# Patient Record
Sex: FEMALE | Race: WHITE | NOT HISPANIC OR LATINO | Employment: OTHER | URBAN - METROPOLITAN AREA
[De-identification: names, ages, dates, MRNs, and addresses within clinical notes are randomized per-mention and may not be internally consistent; named-entity substitution may affect disease eponyms.]

---

## 2020-08-28 ENCOUNTER — EVALUATION (OUTPATIENT)
Dept: PHYSICAL THERAPY | Age: 75
End: 2020-08-28
Payer: COMMERCIAL

## 2020-08-28 DIAGNOSIS — Z47.1 AFTERCARE FOLLOWING LEFT SHOULDER JOINT REPLACEMENT SURGERY: ICD-10-CM

## 2020-08-28 DIAGNOSIS — Z96.612 STATUS POST TOTAL SHOULDER REPLACEMENT, LEFT: Primary | ICD-10-CM

## 2020-08-28 DIAGNOSIS — Z96.612 AFTERCARE FOLLOWING LEFT SHOULDER JOINT REPLACEMENT SURGERY: ICD-10-CM

## 2020-08-28 PROCEDURE — 97110 THERAPEUTIC EXERCISES: CPT | Performed by: PHYSICAL THERAPIST

## 2020-08-28 PROCEDURE — 97162 PT EVAL MOD COMPLEX 30 MIN: CPT | Performed by: PHYSICAL THERAPIST

## 2020-08-28 RX ORDER — ROPINIROLE 0.5 MG/1
0.5 TABLET, FILM COATED ORAL 2 TIMES DAILY
COMMUNITY

## 2020-08-28 RX ORDER — LISINOPRIL 40 MG/1
40 TABLET ORAL DAILY
COMMUNITY

## 2020-08-28 NOTE — PROGRESS NOTES
PT Evaluation     Today's date: 2020  Patient name: Alvina Chamorro  : 1945  MRN: [id number]  Referring provider: Jose Ndiaye MD  Dx:   Encounter Diagnosis     ICD-10-CM    1  Status post total shoulder replacement, left  Z96 612    2  Aftercare following left shoulder joint replacement surgery  Z47 1     Z96 612        Start Time: 945  Stop Time: 1045  Total time in clinic (min): 60 minutes    Assessment  Assessment details: PT IE: 2020  Patient had left TSA on 2020  Patient has the following movement limitations as per surgeon: max ER at 30 degrees, NO active IR to protect the subscapularis, wear sling for 4 weeks ( until 2020) aarom flexion at 110 and active elbow wrist and hand movements to pt tolerance  Patient noted she as nauseated yesterday which she feels is due to anesthesia  Patient noted she had a standard TSA on 2020  Patient noted prior to surgery left GH joint was painful with elevation based movements which worsened and then due to inactivity due to COVID her left shoulder pain worsened  Patient noted prior to surgical intervention she will have intermittent left clavicle to neck to jaw pain which was present when overall activity during the day increased  Patient noted she is right ue dominant  Patient noted she still has left 1 and 2 finger tingling sensation due to left ue nerve block which she noted yesterday all 5 fingers were tingling  Patient denies left hand edema  Patient noted she was able to sleep in a recliner due to sleep in a bed not occurring due to extreme difficulties getting in and out of bed  Patient noted she returns to Dr Melissa Briones in one month  Patient noted the day after surgery she was instructed to not move her left shoulder  Patient noted she requires assistance of getting dressed as well as sling donning and doffing    Impairments: abnormal or restricted ROM, abnormal movement, activity intolerance, lacks appropriate home exercise program and pain with function  Understanding of Dx/Px/POC: excellent   Prognosis: good  Prognosis details: Patient is a 76y o  year old female seen for outpatient PT evaluation with pain, mobility and functional deficits due to s/p left TSA  Patient presents to PT IE with the following problems, concerns, deficits and impairments: left shoulder region pain, decreased left ue range of motion, decreased left ue strength, restriction of no left GH arom, wearing of left ue sling for 4 weeks after surgical intervention which took place on 08/26/2020, functional limitations with dependent need for dressing, self iadls, driving, sleep deprived and decreased tolerance to activity  Patient would benefit from skilled PT services under the following PT treatment plan to address the above noted deficits: therapeutic exercises and activities to facilitate left ue rom and strength as per surgeon specific protocols, postural reeducation and strengthening, modalities, manual therapy techniques, IASTM techniques, Kinesio tapings and a hep  Thank you for the referral      Goals  Short Term goals 4 - 6 weeks  1  Patient will be independent HEP  2   Patient will report a 25 - 50% decrease in pain complaints  3   Increase strength 1/2 grade  4   Increase ROM 5-10 degrees  Long Term goals 10 - 12 weeks  1  Patient will report elimination of pain complaints  2   Patient will return to all recreational activities without restriction  3   ROM WFL  4   Strength 5/5   5   Patient will exhibit the ability to sleep in bed  6   Patient will exhibit the ability to dress herself without assistance  7   Patient will exhibit the ability to shower independently  8   Patient will be able to assist  with his activities as she did pre-surgery  9   Patient will be able to drive independently  10   Patient will be able to resume all house hold functional activities      Plan  Patient would benefit from: skilled physical therapy  Planned modality interventions: cryotherapy, TENS, thermotherapy: hydrocollator packs and unattended electrical stimulation  Planned therapy interventions: joint mobilization, manual therapy, massage, neuromuscular re-education, body mechanics training, patient education, postural training, self care, compression, strengthening, stretching, therapeutic activities, therapeutic exercise, therapeutic training, flexibility, functional ROM exercises, graded activity, graded exercise, graded motor and home exercise program  Frequency: 2x week  Duration in weeks: 12  Treatment plan discussed with: patient        Subjective Evaluation    History of Present Illness  Mechanism of injury: Patient's PMHx is remarkable for HTN with 40 mg of Lisinopril and taking metformin 500 mg 2 x per day, and restless leg syndrome and is taking Requip 2 x per day and bilateral TKA  Pain  At best pain ratin  At worst pain ratin  Location: left shoulder    Patient Goals  Patient goals for therapy: decreased pain, increased motion, increased strength and independence with ADLs/IADLs          Objective     Active Range of Motion     Right Shoulder   Flexion: 164 degrees   Abduction: 162 degrees   External rotation 90°: 80 degrees  Internal rotation 90°: 55 degrees     Right Elbow   Flexion: 142 degrees   Extension: -5 degrees     Passive Range of Motion   Left Shoulder   Flexion: 60 degrees with pain  Abduction: 45 degrees with pain  External rotation 0°: 0 degrees with pain  Internal rotation 0°: 10 degrees with pain    Strength/Myotome Testing     Right Shoulder     Planes of Motion   Flexion: 5   Abduction: 4+   External rotation at 0°: 4   Internal rotation at 0°: 5     Right Elbow   Flexion: 5  Extension: 5    General Comments:      Shoulder Comments   Patient currently has left TSA incision covered with sterile gauze dressing        Flowsheet Rows      Most Recent Value   PT/OT G-Codes   Current Score  4   Projected Score 48             Precautions: s/p Right TSA on 08/26/2020: max ER at 30 degrees, NO active IR to protect the subscapularis, wear sling for 4 weeks ( until 9/26/2020) aarom flexion at 110 and active elbow wrist and hand movements to pt tolerance  Patient's PMHx is remarkable for HTN with 40 mg of Lisinopril and taking metformin 500 mg 2 x per day, and restless leg syndrome and is taking Requip 2 x per day and bilateral TKA        Manuals 08/28            Prom left GH joint with specific restrictions noted above                                                    Neuro Re-Ed                                                                                                        Ther Ex             pulleys             Scapular squeezes             Shoulder shrugs             Pendulums:L forward / back 5 x            Elbow flexion:L 3 x             Forearm supination:L 3 x             Wrist flexion and extension:L 3 x             Hand gripping:L 3 x             Left UT stretch 2 x             Left LS stretch             Postural correction while seated             Prom left shoulder flexion table slides                                       Left shoulder prom ER with spc with motion restrictions listed above                                                                              Ther Activity                                       Gait Training                                       Modalities             CP to left GH joint with pt seated 10 min

## 2020-08-28 NOTE — LETTER
October 15, 2020    [facility] [address] [facility]    Patient: Luna Michael   YOB: 1945   Date of Visit: 2020     Encounter Diagnosis     ICD-10-CM    1  Status post total shoulder replacement, left  Z96 612    2  Aftercare following left shoulder joint replacement surgery  Z47 1     [id number]        Dear Dr Mark Coleman: Thank you for your recent referral of Luna Michael  Please review the attached evaluation summary from [facility] recent visit  Please verify that you agree with the plan of care by signing the attached order  If you have any questions or concerns, please do not hesitate to call  I sincerely appreciate the opportunity to share in the care of one of your patients and hope to have another opportunity to work with you in the near future  Sincerely,    Michael Rivas, PT      Referring Provider:      I certify that I have read the below Plan of Care and certify the need for these services furnished under this plan of treatment while under my care  Prabha Britton MD  [facility]: [phone]          PT Evaluation     Today's date: 2020  Patient name: Luna Michael  : 1945  MRN: [id number]  Referring provider: Joe Ward MD  Dx:   Encounter Diagnosis     ICD-10-CM    1  Status post total shoulder replacement, left  Z96 612    2  Aftercare following left shoulder joint replacement surgery  Z47 1     Z96 612        Start Time: 0945  Stop Time: 1045  Total time in clinic (min): 60 minutes    Assessment  Assessment details: PT IE: 2020  Patient had left TSA on 2020  Patient has the following movement limitations as per surgeon: max ER at 30 degrees, NO active IR to protect the subscapularis, wear sling for 4 weeks ( until 2020) aarom flexion at 110 and active elbow wrist and hand movements to pt tolerance    Patient noted she as nauseated yesterday which she feels is due to anesthesia  Patient noted she had a standard TSA on 8/26/2020  Patient noted prior to surgery left GH joint was painful with elevation based movements which worsened and then due to inactivity due to COVID her left shoulder pain worsened  Patient noted prior to surgical intervention she will have intermittent left clavicle to neck to jaw pain which was present when overall activity during the day increased  Patient noted she is right ue dominant  Patient noted she still has left 1 and 2 finger tingling sensation due to left ue nerve block which she noted yesterday all 5 fingers were tingling  Patient denies left hand edema  Patient noted she was able to sleep in a recliner due to sleep in a bed not occurring due to extreme difficulties getting in and out of bed  Patient noted she returns to Dr Olson Failing in one month  Patient noted the day after surgery she was instructed to not move her left shoulder  Patient noted she requires assistance of getting dressed as well as sling donning and doffing  Impairments: abnormal or restricted ROM, abnormal movement, activity intolerance, lacks appropriate home exercise program and pain with function  Understanding of Dx/Px/POC: excellent   Prognosis: good  Prognosis details: Patient is a 76y o  year old female seen for outpatient PT evaluation with pain, mobility and functional deficits due to s/p left TSA  Patient presents to PT IE with the following problems, concerns, deficits and impairments: left shoulder region pain, decreased left ue range of motion, decreased left ue strength, restriction of no left GH arom, wearing of left ue sling for 4 weeks after surgical intervention which took place on 08/26/2020, functional limitations with dependent need for dressing, self iadls, driving, sleep deprived and decreased tolerance to activity    Patient would benefit from skilled PT services under the following PT treatment plan to address the above noted deficits: therapeutic exercises and activities to facilitate left ue rom and strength as per surgeon specific protocols, postural reeducation and strengthening, modalities, manual therapy techniques, IASTM techniques, Kinesio tapings and a hep  Thank you for the referral      Goals  Short Term goals 4 - 6 weeks  1  Patient will be independent HEP  2   Patient will report a 25 - 50% decrease in pain complaints  3   Increase strength 1/2 grade  4   Increase ROM 5-10 degrees  Long Term goals 10 - 12 weeks  1  Patient will report elimination of pain complaints  2   Patient will return to all recreational activities without restriction  3   ROM WFL  4   Strength 5/5   5   Patient will exhibit the ability to sleep in bed  6   Patient will exhibit the ability to dress herself without assistance  7   Patient will exhibit the ability to shower independently  8   Patient will be able to assist  with his activities as she did pre-surgery  9   Patient will be able to drive independently  10   Patient will be able to resume all house hold functional activities      Plan  Patient would benefit from: skilled physical therapy  Planned modality interventions: cryotherapy, TENS, thermotherapy: hydrocollator packs and unattended electrical stimulation  Planned therapy interventions: joint mobilization, manual therapy, massage, neuromuscular re-education, body mechanics training, patient education, postural training, self care, compression, strengthening, stretching, therapeutic activities, therapeutic exercise, therapeutic training, flexibility, functional ROM exercises, graded activity, graded exercise, graded motor and home exercise program  Frequency: 2x week  Duration in weeks: 12  Treatment plan discussed with: patient        Subjective Evaluation    History of Present Illness  Mechanism of injury: Patient's PMHx is remarkable for HTN with 40 mg of Lisinopril and taking metformin 500 mg 2 x per day, and restless leg syndrome and is taking Requip 2 x per day and bilateral TKA  Pain  At best pain ratin  At worst pain ratin  Location: left shoulder    Patient Goals  Patient goals for therapy: decreased pain, increased motion, increased strength and independence with ADLs/IADLs          Objective     Active Range of Motion     Right Shoulder   Flexion: 164 degrees   Abduction: 162 degrees   External rotation 90°: 80 degrees  Internal rotation 90°: 55 degrees     Right Elbow   Flexion: 142 degrees   Extension: -5 degrees     Passive Range of Motion   Left Shoulder   Flexion: 60 degrees with pain  Abduction: 45 degrees with pain  External rotation 0°: 0 degrees with pain  Internal rotation 0°: 10 degrees with pain    Strength/Myotome Testing     Right Shoulder     Planes of Motion   Flexion: 5   Abduction: 4+   External rotation at 0°: 4   Internal rotation at 0°: 5     Right Elbow   Flexion: 5  Extension: 5    General Comments:      Shoulder Comments   Patient currently has left TSA incision covered with sterile gauze dressing  Flowsheet Rows      Most Recent Value   PT/OT G-Codes   Current Score  4   Projected Score  48             Precautions: s/p Right TSA on 2020: max ER at 30 degrees, NO active IR to protect the subscapularis, wear sling for 4 weeks ( until 2020) aarom flexion at 110 and active elbow wrist and hand movements to pt tolerance  Patient's PMHx is remarkable for HTN with 40 mg of Lisinopril and taking metformin 500 mg 2 x per day, and restless leg syndrome and is taking Requip 2 x per day and bilateral TKA        Manuals             Prom left GH joint with specific restrictions noted above                                                    Neuro Re-Ed                                                                                                        Ther Ex             pulleys             Scapular squeezes             Shoulder shrugs Pendulums:L forward / back 5 x            Elbow flexion:L 3 x             Forearm supination:L 3 x             Wrist flexion and extension:L 3 x             Hand gripping:L 3 x             Left UT stretch 2 x             Left LS stretch             Postural correction while seated             Prom left shoulder flexion table slides                                       Left shoulder prom ER with spc with motion restrictions listed above                                                                              Ther Activity                                       Gait Training                                       Modalities             CP to left GH joint with pt seated 10 min

## 2020-09-01 ENCOUNTER — OFFICE VISIT (OUTPATIENT)
Dept: PHYSICAL THERAPY | Age: 75
End: 2020-09-01
Payer: COMMERCIAL

## 2020-09-01 DIAGNOSIS — Z96.612 AFTERCARE FOLLOWING LEFT SHOULDER JOINT REPLACEMENT SURGERY: Primary | ICD-10-CM

## 2020-09-01 DIAGNOSIS — Z47.1 AFTERCARE FOLLOWING LEFT SHOULDER JOINT REPLACEMENT SURGERY: Primary | ICD-10-CM

## 2020-09-01 DIAGNOSIS — Z96.612 STATUS POST TOTAL SHOULDER REPLACEMENT, LEFT: ICD-10-CM

## 2020-09-01 PROCEDURE — 97140 MANUAL THERAPY 1/> REGIONS: CPT

## 2020-09-01 PROCEDURE — 97110 THERAPEUTIC EXERCISES: CPT

## 2020-09-01 NOTE — PROGRESS NOTES
Daily Note     Today's date: 2020  Patient name: Todd Stewart  : 1945  MRN: [id number]  Referring provider: Jesica Singer MD  Dx:   Encounter Diagnosis     ICD-10-CM    1  Aftercare following left shoulder joint replacement surgery  Z47 1     Z96 612    2  Status post total shoulder replacement, left  Z96 612                   Subjective: "I have no pain I just wish I could sleep"       Objective: See treatment diary below      Assessment: Ecchymosis noted at L UE, good tolerance to PROM  Plan: Cont with plan of care      Precautions: s/p Right TSA on 2020: max ER at 30 degrees, NO active IR to protect the subscapularis, wear sling for 4 weeks ( until 2020) aarom flexion at 110 and active elbow wrist and hand movements to pt tolerance  Patient's PMHx is remarkable for HTN with 40 mg of Lisinopril and taking metformin 500 mg 2 x per day, and restless leg syndrome and is taking Requip 2 x per day and bilateral TKA        Manuals           Prom left GH joint with specific restrictions noted above  10 MIN                       Ther Ex            pulleys  5 min           Scapular squeezes  20x 2sec           Shoulder shrugs  NT          Pendulums:L forward / back 5 x 20x           Elbow flexion:L 3 x  20x           Forearm supination:L 3 x            Wrist flexion and extension:L 3 x  20x           Hand gripping:L 3 x  Yellow  10x3          Left UT stretch 2 x  20sec 5x           Left LS stretch  NT           Postural correction while seated  20X           Prom left shoulder flexion table slides  NT                                  Left shoulder prom ER with spc with motion restrictions listed above  NT                                              Modalities            CP to left GH joint with pt seated 10 min 10 MIN

## 2020-09-04 ENCOUNTER — OFFICE VISIT (OUTPATIENT)
Dept: PHYSICAL THERAPY | Age: 75
End: 2020-09-04
Payer: COMMERCIAL

## 2020-09-04 DIAGNOSIS — Z47.1 AFTERCARE FOLLOWING LEFT SHOULDER JOINT REPLACEMENT SURGERY: Primary | ICD-10-CM

## 2020-09-04 DIAGNOSIS — Z96.612 AFTERCARE FOLLOWING LEFT SHOULDER JOINT REPLACEMENT SURGERY: Primary | ICD-10-CM

## 2020-09-04 DIAGNOSIS — Z96.612 STATUS POST TOTAL SHOULDER REPLACEMENT, LEFT: ICD-10-CM

## 2020-09-04 PROCEDURE — 97110 THERAPEUTIC EXERCISES: CPT | Performed by: PHYSICAL THERAPIST

## 2020-09-04 PROCEDURE — 97140 MANUAL THERAPY 1/> REGIONS: CPT | Performed by: PHYSICAL THERAPIST

## 2020-09-04 NOTE — PROGRESS NOTES
Daily Note     Today's date: 2020  Patient name: Pinky Adorno  : 1945  MRN: [id number]  Referring provider: Jesse Sams MD  Dx:   Encounter Diagnosis     ICD-10-CM    1  Aftercare following left shoulder joint replacement surgery  Z47 1     Z96 612    2  Status post total shoulder replacement, left  Z96 612                   Subjective: Patient reported her left UE sling does not provide enough support and she feels as if she is using her left shoulder and thus it is very fatigued  Patient noted at 1-2 of 10 left [address] joint region pain persists  Objective: See treatment diary below      Assessment: Patient still exhibits Ecchymosis on left biceps and ventral left forearm region  Patient presents with left [address] joint elevation at 105 degrees and ER at 25 degrees  Plan: Cont with plan of care      Precautions: s/p Right TSA on 2020: max ER at 30 degrees, NO active IR to protect the subscapularis, wear sling for 4 weeks ( until 2020) aarom flexion at 110 and active elbow wrist and hand movements to pt tolerance  Patient's PMHx is remarkable for HTN with 40 mg of Lisinopril and taking metformin 500 mg 2 x per day, and restless leg syndrome and is taking Requip 2 x per day and bilateral TKA        Manuals          Prom left GH joint with specific restrictions noted above  10 MIN  10 min                     Ther Ex            pulleys  5 min  6 min         Scapular squeezes  20x 2sec  3 sec x 20         Shoulder shrugs  NT          Pendulums:L forward / back & side to side 5 x 20x  2 x 10         Elbow flexion:L 3 x  20x  2 x 10         Forearm supination:L 3 x   2 x 10         Wrist flexion and extension:L 3 x  20x  2 x 10         Hand gripping:L 3 x  Yellow  10x3 Green x 20         Left UT stretch 2 x  20sec 5x  20 sec x 5         Left LS stretch  NT  20 sec x 5         Postural correction while seated  20X  3 sec x 20         Prom left shoulder flexion table slides  NT 2 x 10                                 Left shoulder prom ER with spc with motion restrictions listed above  NT NT                                             Modalities            CP to left GH joint with pt seated 10 min 10 MIN  10 min

## 2020-09-08 ENCOUNTER — OFFICE VISIT (OUTPATIENT)
Dept: PHYSICAL THERAPY | Age: 75
End: 2020-09-08
Payer: COMMERCIAL

## 2020-09-08 DIAGNOSIS — Z96.612 AFTERCARE FOLLOWING LEFT SHOULDER JOINT REPLACEMENT SURGERY: Primary | ICD-10-CM

## 2020-09-08 DIAGNOSIS — Z47.1 AFTERCARE FOLLOWING LEFT SHOULDER JOINT REPLACEMENT SURGERY: Primary | ICD-10-CM

## 2020-09-08 DIAGNOSIS — Z96.612 STATUS POST TOTAL SHOULDER REPLACEMENT, LEFT: ICD-10-CM

## 2020-09-08 PROCEDURE — 97140 MANUAL THERAPY 1/> REGIONS: CPT | Performed by: PHYSICAL THERAPIST

## 2020-09-08 PROCEDURE — 97110 THERAPEUTIC EXERCISES: CPT | Performed by: PHYSICAL THERAPIST

## 2020-09-08 NOTE — PROGRESS NOTES
Daily Note     Today's date: 2020  Patient name: Duyen Lehman  : 1945  MRN: [id number]  Referring provider: Jenn Luna MD  Dx:   Encounter Diagnosis     ICD-10-CM    1  Aftercare following left shoulder joint replacement surgery  Z47 1     Z96 612    2  Status post total shoulder replacement, left  Z96 612                   Subjective: Patient noted left shoulder pain is decreased since betting her left ue sling to fit better   Patient noted left shoulder pain is at 1-2 of 10  Objective: See treatment diary below  Assessment:  Patient continues to experience left shoulder pain reduction since adjustment in left UE sling  Patient continues to exhibit continued decrease in Ecchymosis on left biceps and ventral left forearm region  Patient presents with left [address] joint elevation at 110 degrees and ER at 30 degrees  Plan: Cont with plan of care      Precautions: s/p Right TSA on 2020: max ER at 30 degrees, NO active IR to protect the subscapularis, wear sling for 4 weeks ( until 2020) aarom flexion at 110 and active elbow wrist and hand movements to pt tolerance  Patient's PMHx is remarkable for HTN with 40 mg of Lisinopril and taking metformin 500 mg 2 x per day, and restless leg syndrome and is taking Requip 2 x per day and bilateral TKA        Manuals         Prom left GH joint with specific restrictions noted above  10 MIN  10 min 10 min                    Ther Ex            pulleys  5 min  6 min 6 min        Scapular squeezes  20x 2sec  3 sec x 20 3 sec x 20        Shoulder shrugs  NT          Pendulums:L forward / back & side to side 5 x 20x  2 x 10 2 x 10        Elbow flexion:L 3 x  20x  2 x 10 2 x 10        Forearm supination:L 3 x   2 x 10 2 x 10        Wrist flexion and extension:L 3 x  20x  2 x 10 2 x 10          Hand gripping:L 3 x  Yellow  10x3 Green x 20 Green x 20        Left UT stretch 2 x  20sec 5x  20 sec x 5         Left LS stretch  NT  20 sec x 5         Postural correction while seated  20X  3 sec x 20         Prom left shoulder flexion table slides  NT 2 x 10 2 x 10                                Left shoulder prom ER and abd with spc with motion restrictions listed above  NT NT 2 x 10                                            Modalities            CP to left GH joint with pt seated 10 min 10 MIN  10 min

## 2020-09-11 ENCOUNTER — OFFICE VISIT (OUTPATIENT)
Dept: PHYSICAL THERAPY | Age: 75
End: 2020-09-11
Payer: COMMERCIAL

## 2020-09-11 DIAGNOSIS — Z96.612 STATUS POST TOTAL SHOULDER REPLACEMENT, LEFT: ICD-10-CM

## 2020-09-11 DIAGNOSIS — Z47.1 AFTERCARE FOLLOWING LEFT SHOULDER JOINT REPLACEMENT SURGERY: Primary | ICD-10-CM

## 2020-09-11 DIAGNOSIS — Z96.612 AFTERCARE FOLLOWING LEFT SHOULDER JOINT REPLACEMENT SURGERY: Primary | ICD-10-CM

## 2020-09-11 PROCEDURE — 97140 MANUAL THERAPY 1/> REGIONS: CPT | Performed by: PHYSICAL THERAPIST

## 2020-09-11 PROCEDURE — 97110 THERAPEUTIC EXERCISES: CPT | Performed by: PHYSICAL THERAPIST

## 2020-09-11 NOTE — PROGRESS NOTES
Daily Note     Today's date: 2020  Patient name: Nadir Bridges  : 1945  MRN: [id number]  Referring provider: Ha Hines MD  Dx:   Encounter Diagnosis     ICD-10-CM    1  Aftercare following left shoulder joint replacement surgery  Z47 1     Z96 612    2  Status post total shoulder replacement, left  Z96 612                   Subjective: Patient noted left shoulder pain is decreased since betting her left ue sling to fit better   Patient noted left shoulder pain is at 1 of 10  Objective: See treatment diary below  Assessment:  Patient presents with apprehension with all prom but all prom elevation and IR is at protocol limits while ER is at 40 degrees  Plan: Cont with plan of care      Precautions: s/p Right TSA on 2020: max ER at 30 degrees, NO active IR to protect the subscapularis, wear sling for 4 weeks ( until 2020) aarom flexion at 110 and active elbow wrist and hand movements to pt tolerance  Weeks 1-2 (2020 - 2020 ) : FF at 90; abd at 75, ER at 45 abd at 30 and IR at 45 abd at 30  Weeks 3-4 (09/10/2020 - 2020 ) : FF at 120; abd at 90, ER at 75 abd at 50 and IR at 75 abd at 45      Patient's PMHx is remarkable for HTN with 40 mg of Lisinopril and taking metformin 500 mg 2 x per day, and restless leg syndrome and is taking Requip 2 x per day and bilateral TKA        Manuals        Prom left GH joint with specific restrictions noted above  10 MIN  10 min 10 min 10 min                   Ther Ex            pulleys  5 min  6 min 6 min 6 min       Scapular squeezes  20x 2sec  3 sec x 20 3 sec x 20 3 sec x 20       Shoulder shrugs  NT NT NT NT       Pendulums:L forward / back & side to side 5 x 20x  2 x 10 2 x 10 2 x 10       Elbow flexion:L 3 x  20x  2 x 10 2 x 10 2 x 10       Forearm supination:L 3 x   2 x 10 2 x 10 2 x 10       Wrist flexion and extension:L 3 x  20x  2 x 10 2 x 10   2 x 10       Hand gripping:L 3 x  Yellow  10x3 Green x 20 Green x 20 NT       Left UT stretch 2 x  20sec 5x  20 sec x 5  20 sec x 5       Left LS stretch  NT  20 sec x 5  20 sec x 5       Postural correction while seated  20X  3 sec x 20  3 sec x 20       Prom left shoulder flexion table slides  NT 2 x 10 2 x 10 2 x 10 flex and scap                               Left shoulder prom ER and abd with spc with motion restrictions listed above  NT NT 2 x 10 2 x 10                                           Modalities            CP to left GH joint with pt seated 10 min 10 MIN  10 min  10 min

## 2020-09-16 ENCOUNTER — OFFICE VISIT (OUTPATIENT)
Dept: PHYSICAL THERAPY | Age: 75
End: 2020-09-16
Payer: COMMERCIAL

## 2020-09-16 DIAGNOSIS — Z96.612 AFTERCARE FOLLOWING LEFT SHOULDER JOINT REPLACEMENT SURGERY: Primary | ICD-10-CM

## 2020-09-16 DIAGNOSIS — Z47.1 AFTERCARE FOLLOWING LEFT SHOULDER JOINT REPLACEMENT SURGERY: Primary | ICD-10-CM

## 2020-09-16 DIAGNOSIS — Z96.612 STATUS POST TOTAL SHOULDER REPLACEMENT, LEFT: ICD-10-CM

## 2020-09-16 PROCEDURE — 97140 MANUAL THERAPY 1/> REGIONS: CPT | Performed by: PHYSICAL THERAPIST

## 2020-09-16 PROCEDURE — 97110 THERAPEUTIC EXERCISES: CPT | Performed by: PHYSICAL THERAPIST

## 2020-09-18 ENCOUNTER — OFFICE VISIT (OUTPATIENT)
Dept: PHYSICAL THERAPY | Age: 75
End: 2020-09-18
Payer: COMMERCIAL

## 2020-09-18 DIAGNOSIS — Z47.1 AFTERCARE FOLLOWING LEFT SHOULDER JOINT REPLACEMENT SURGERY: Primary | ICD-10-CM

## 2020-09-18 DIAGNOSIS — Z96.612 STATUS POST TOTAL SHOULDER REPLACEMENT, LEFT: ICD-10-CM

## 2020-09-18 DIAGNOSIS — Z96.612 AFTERCARE FOLLOWING LEFT SHOULDER JOINT REPLACEMENT SURGERY: Primary | ICD-10-CM

## 2020-09-18 PROCEDURE — 97140 MANUAL THERAPY 1/> REGIONS: CPT | Performed by: PHYSICAL THERAPIST

## 2020-09-18 PROCEDURE — 97110 THERAPEUTIC EXERCISES: CPT | Performed by: PHYSICAL THERAPIST

## 2020-09-18 NOTE — PROGRESS NOTES
Daily Note     Today's date: 2020  Patient name: Dinah Rodriguez  : 1945  MRN: [id number]  Referring provider: Thomas Gray MD  Dx:   Encounter Diagnosis     ICD-10-CM    1  Aftercare following left shoulder joint replacement surgery  Z47 1     Z96 612    2  Status post total shoulder replacement, left  Z96 612                   Subjective: Patient noted she removed her left abduction pillow from her left ue sling and she feels better  But, she noted she is using her left ue more thus she was educated on left [facility] joint prom as per surgeon  Patient noted left shoulder pain persists at a 1 of 10  Objective: See treatment diary below  Assessment:  Patient presents with all left [facility] joint prom at surgeon specific levels without pain production at this point in the surgeon specific protocol  Patient to d/c sling on 2020 with pt understanding this  Patient continues to exhibit sleep deficits due to use of left ue sling and prom only  Patient educated on no arom until 6 weeks despite left ue sling d/c at 4 weeks  Plan: Cont with plan of care      Precautions: s/p Right TSA on 2020: max ER at 30 degrees, NO active IR to protect the subscapularis, wear sling for 4 weeks ( until 2020) aarom flexion at 110 and active elbow wrist and hand movements to pt tolerance  Weeks 1-2 (2020 - 2020 ) : FF at 90; abd at 75, ER at 45 abd at 30 and IR at 45 abd at 30  Weeks 3-4 (09/10/2020 - 2020 ) : FF at 120; abd at 90, ER at 75 abd at 50 and IR at 75 abd at 45      Patient's PMHx is remarkable for HTN with 40 mg of Lisinopril and taking metformin 500 mg 2 x per day, and restless leg syndrome and is taking Requip 2 x per day and bilateral TKA        Manuals      Prom left GH joint with specific restrictions noted above  10 MIN  10 min 10 min 10 min 10 min 10 min                 Ther Ex            pulleys  5 min  6 min 6 min 6 min 6 min 6 min Scapular squeezes  20x 2sec  3 sec x 20 3 sec x 20 3 sec x 20 3 sec x 20 3 sec x 30     Shoulder shrugs  NT NT NT NT NT NT     Pendulums:L forward / back & side to side 5 x 20x  2 x 10 2 x 10 2 x 10 2 x 10 2 x 10     Elbow flexion:L 3 x  20x  2 x 10 2 x 10 2 x 10 2 x 10 3 x 10     Forearm supination:L 3 x   2 x 10 2 x 10 2 x 10 2 x 10 3 x 10     Wrist flexion and extension:L 3 x  20x  2 x 10 2 x 10   2 x 10 2 x 10 3 x 10     Hand gripping:L 3 x  Yellow  10x3 Green x 20 Green x 20 NT NT Blue x 30     Left UT stretch 2 x  20sec 5x  20 sec x 5  20 sec x 5 20 sec x 5 20 sec x 5     Left LS stretch  NT  20 sec x 5  20 sec x 5 20 sec x 5 20 sec x 5     Postural correction while seated  20X  3 sec x 20  3 sec x 20 20 sec x 5 20 sec x 5     Prom left shoulder flexion table slides  NT 2 x 10 2 x 10 2 x 10 flex and scap 2 x 10 of each 2 x 10 prom flex and scap     Shoulder isometrics flex, abd and ER:L no IR until 6th week      5 sec x 20 5 sec x 20                 Left shoulder prom ER and abd with spc with motion restrictions listed above  NT NT 2 x 10 2 x 10 2 x 10 2 x 10                                         Modalities            CP to left GH joint with pt seated 10 min 10 MIN  10 min  10 min 10 min 10 min

## 2020-09-22 ENCOUNTER — OFFICE VISIT (OUTPATIENT)
Dept: PHYSICAL THERAPY | Age: 75
End: 2020-09-22
Payer: COMMERCIAL

## 2020-09-22 DIAGNOSIS — Z96.612 AFTERCARE FOLLOWING LEFT SHOULDER JOINT REPLACEMENT SURGERY: Primary | ICD-10-CM

## 2020-09-22 DIAGNOSIS — Z47.1 AFTERCARE FOLLOWING LEFT SHOULDER JOINT REPLACEMENT SURGERY: Primary | ICD-10-CM

## 2020-09-22 DIAGNOSIS — Z96.612 STATUS POST TOTAL SHOULDER REPLACEMENT, LEFT: ICD-10-CM

## 2020-09-22 PROCEDURE — 97110 THERAPEUTIC EXERCISES: CPT | Performed by: PHYSICAL THERAPIST

## 2020-09-22 PROCEDURE — 97140 MANUAL THERAPY 1/> REGIONS: CPT | Performed by: PHYSICAL THERAPIST

## 2020-09-22 NOTE — PROGRESS NOTES
Daily Note     Today's date: 2020  Patient name: Susi Emerson  : 1945  MRN: [id number]  Referring provider: Alvino Mahajan MD  Dx:   Encounter Diagnosis     ICD-10-CM    1  Aftercare following left shoulder joint replacement surgery  Z47 1     Z96 612    2  Status post total shoulder replacement, left  Z96 612                   Subjective: Patient noted she removed her left abduction pillow from her left ue sling and she feels better overall and she noted her left shoulder pain is at 1 of 10  But, she till understands that she is left [address] joint prom only now and until week 6 after surgery which she is able to d/c sling  Objective: See treatment diary below  Assessment:  Patient presents with all left [address] joint prom at surgeon specific levels without pain production at this point in the surgeon specific protocol  Patient to d/c sling on 2020 with pt understanding this  Patient continues to exhibit sleep, dressing, self iadl deficits due to use of left ue sling and prom only  Patient educated on no arom until 6 weeks despite left ue sling d/c at 4 weeks  Patient presents with left [address] joint PROM limited by surgeon specific limits, not pain  Plan: Cont with plan of care      Precautions: s/p Right TSA on 2020: max ER at 30 degrees, NO active IR to protect the subscapularis, wear sling for 4 weeks ( until 2020) aarom flexion at 110 and active elbow wrist and hand movements to pt tolerance  Weeks 1-2 (2020 - 2020 ) : FF at 90; abd at 75, ER at 45 abd at 30 and IR at 45 abd at 30  Weeks 3-4 (09/10/2020 - 2020 ) : FF at 120; abd at 90, ER at 75 abd at 50 and IR at 75 abd at 45      Patient's PMHx is remarkable for HTN with 40 mg of Lisinopril and taking metformin 500 mg 2 x per day, and restless leg syndrome and is taking Requip 2 x per day and bilateral TKA        Manuals  9    Prom left GH joint with specific restrictions noted above  10 MIN  10 min 10 min 10 min 10 min 10 min 10 min                Ther Ex            pulleys  5 min  6 min 6 min 6 min 6 min 6 min 6 min    Scapular squeezes  20x 2sec  3 sec x 20 3 sec x 20 3 sec x 20 3 sec x 20 3 sec x 30 3 sec x 30    Shoulder shrugs  NT NT NT NT NT NT NT    Pendulums:L forward / back & side to side 5 x 20x  2 x 10 2 x 10 2 x 10 2 x 10 2 x 10 2 x 10    Elbow flexion:L 3 x  20x  2 x 10 2 x 10 2 x 10 2 x 10 3 x 10 3 x 10    Forearm supination:L 3 x   2 x 10 2 x 10 2 x 10 2 x 10 3 x 10 3 x 10    Wrist flexion and extension:L 3 x  20x  2 x 10 2 x 10   2 x 10 2 x 10 3 x 10 3 x 10    Hand gripping:L 3 x  Yellow  10x3 Green x 20 Green x 20 NT NT Blue x 30 Blue x 30    Left UT stretch 2 x  20sec 5x  20 sec x 5  20 sec x 5 20 sec x 5 20 sec x 5 20 sec x 5    Left LS stretch  NT  20 sec x 5  20 sec x 5 20 sec x 5 20 sec x 5 20 sec x 5    Postural correction while seated  20X  3 sec x 20  3 sec x 20 3 sec x 20 3 sec x 20 3 sec x 20    Prom left shoulder flexion table slides  NT 2 x 10 2 x 10 2 x 10 flex and scap 2 x 10 of each 2 x 10 prom flex and scap 2 x 10 prom flex and scap    Shoulder isometrics flex, abd and ER:L no IR until 6th week      5 sec x 20 5 sec x 20 5 sec x 20                Left shoulder prom ER and abd with spc with motion restrictions listed above  NT NT 2 x 10 2 x 10 2 x 10 2 x 10 2 x 10                                        Modalities            CP to left GH joint with pt seated 10 min 10 MIN  10 min  10 min 10 min 10 min 10 min

## 2020-09-25 ENCOUNTER — OFFICE VISIT (OUTPATIENT)
Dept: PHYSICAL THERAPY | Age: 75
End: 2020-09-25
Payer: COMMERCIAL

## 2020-09-25 DIAGNOSIS — Z96.612 STATUS POST TOTAL SHOULDER REPLACEMENT, LEFT: ICD-10-CM

## 2020-09-25 DIAGNOSIS — Z96.612 AFTERCARE FOLLOWING LEFT SHOULDER JOINT REPLACEMENT SURGERY: Primary | ICD-10-CM

## 2020-09-25 DIAGNOSIS — Z47.1 AFTERCARE FOLLOWING LEFT SHOULDER JOINT REPLACEMENT SURGERY: Primary | ICD-10-CM

## 2020-09-25 PROCEDURE — 97140 MANUAL THERAPY 1/> REGIONS: CPT

## 2020-09-25 PROCEDURE — 97110 THERAPEUTIC EXERCISES: CPT

## 2020-09-25 NOTE — PROGRESS NOTES
Daily Note     Today's date: 2020  Patient name: Kaitlin Bray  : 1945  MRN: [id number]  Referring provider: Ramona Boland MD  Dx:   Encounter Diagnosis     ICD-10-CM    1  Aftercare following left shoulder joint replacement surgery  Z47 1     Z96 612    2  Status post total shoulder replacement, left  Z96 612                   Subjective: Pt noted feeling stronger but "I still feel that I need the sling after a whole day of being busy and using my arm"     Objective: See treatment diary below  Assessment: Good tolerance to exercises, PROM within normal limits as per protocol  Plan: Cont with plan of care      Precautions: s/p Right TSA on 2020: max ER at 30 degrees, NO active IR to protect the subscapularis, wear sling for 4 weeks ( until 2020) aarom flexion at 110 and active elbow wrist and hand movements to pt tolerance  Weeks 1-2 (2020 - 2020 ) : FF at 90; abd at 75, ER at 45 abd at 30 and IR at 45 abd at 30  Weeks 3-4 (09/10/2020 - 2020 ) : FF at 120; abd at 90, ER at 75 abd at 50 and IR at 75 abd at 45      Patient's PMHx is remarkable for HTN with 40 mg of Lisinopril and taking metformin 500 mg 2 x per day, and restless leg syndrome and is taking Requip 2 x per day and bilateral TKA        Manuals  9   Prom left GH joint with specific restrictions noted above  10 MIN  10 min 10 min 10 min 10 min 10 min 10 min 10 min                Ther Ex            pulleys  5 min  6 min 6 min 6 min 6 min 6 min 6 min 6 MIN    Scapular squeezes  20x 2sec  3 sec x 20 3 sec x 20 3 sec x 20 3 sec x 20 3 sec x 30 3 sec x 30 30X 3SEC    Shoulder shrugs  NT NT NT NT NT NT NT 30X    Pendulums:L forward / back & side to side 5 x 20x  2 x 10 2 x 10 2 x 10 2 x 10 2 x 10 2 x 10 20X    Elbow flexion:L 3 x  20x  2 x 10 2 x 10 2 x 10 2 x 10 3 x 10 3 x 10 30X    Forearm supination:L 3 x   2 x 10 2 x 10 2 x 10 2 x 10 3 x 10 3 x 10 30X   Wrist flexion and extension:L 3 x  20x  2 x 10 2 x 10   2 x 10 2 x 10 3 x 10 3 x 10 30X    Hand gripping:L 3 x  Yellow  10x3 Green x 20 Green x 20 NT NT Blue x 30 Blue x 30 30X    Left UT stretch 2 x  20sec 5x  20 sec x 5  20 sec x 5 20 sec x 5 20 sec x 5 20 sec x 5 20SEC 5X    Left LS stretch  NT  20 sec x 5  20 sec x 5 20 sec x 5 20 sec x 5 20 sec x 5 20SEC 5X    Postural correction while seated  20X  3 sec x 20  3 sec x 20 3 sec x 20 3 sec x 20 3 sec x 20 20X 3SEC    Prom left shoulder flexion table slides  NT 2 x 10 2 x 10 2 x 10 flex and scap 2 x 10 of each 2 x 10 prom flex and scap 2 x 10 prom flex and scap 20X    Shoulder isometrics flex, abd and ER:L no IR until 6th week      5 sec x 20 5 sec x 20 5 sec x 20 20X 5SEC                Left shoulder prom ER and abd with spc with motion restrictions listed above  NT NT 2 x 10 2 x 10 2 x 10 2 x 10 2 x 10 20X                                        Modalities            CP to left GH joint with pt seated 10 min 10 MIN  10 min  10 min 10 min 10 min 10 min 10 min

## 2020-10-02 ENCOUNTER — OFFICE VISIT (OUTPATIENT)
Dept: PHYSICAL THERAPY | Age: 75
End: 2020-10-02
Payer: COMMERCIAL

## 2020-10-02 DIAGNOSIS — Z47.1 AFTERCARE FOLLOWING LEFT SHOULDER JOINT REPLACEMENT SURGERY: Primary | ICD-10-CM

## 2020-10-02 DIAGNOSIS — Z96.612 AFTERCARE FOLLOWING LEFT SHOULDER JOINT REPLACEMENT SURGERY: Primary | ICD-10-CM

## 2020-10-02 DIAGNOSIS — Z96.612 STATUS POST TOTAL SHOULDER REPLACEMENT, LEFT: ICD-10-CM

## 2020-10-02 PROCEDURE — 97110 THERAPEUTIC EXERCISES: CPT

## 2020-10-02 PROCEDURE — 97140 MANUAL THERAPY 1/> REGIONS: CPT

## 2020-10-05 ENCOUNTER — OFFICE VISIT (OUTPATIENT)
Dept: PHYSICAL THERAPY | Age: 75
End: 2020-10-05
Payer: COMMERCIAL

## 2020-10-05 DIAGNOSIS — Z96.612 AFTERCARE FOLLOWING LEFT SHOULDER JOINT REPLACEMENT SURGERY: Primary | ICD-10-CM

## 2020-10-05 DIAGNOSIS — Z47.1 AFTERCARE FOLLOWING LEFT SHOULDER JOINT REPLACEMENT SURGERY: Primary | ICD-10-CM

## 2020-10-05 DIAGNOSIS — Z96.612 STATUS POST TOTAL SHOULDER REPLACEMENT, LEFT: ICD-10-CM

## 2020-10-05 PROCEDURE — 97140 MANUAL THERAPY 1/> REGIONS: CPT | Performed by: PHYSICAL THERAPIST

## 2020-10-05 PROCEDURE — 97110 THERAPEUTIC EXERCISES: CPT | Performed by: PHYSICAL THERAPIST

## 2020-10-08 ENCOUNTER — EVALUATION (OUTPATIENT)
Dept: PHYSICAL THERAPY | Age: 75
End: 2020-10-08
Payer: COMMERCIAL

## 2020-10-08 DIAGNOSIS — Z96.612 STATUS POST TOTAL SHOULDER REPLACEMENT, LEFT: ICD-10-CM

## 2020-10-08 DIAGNOSIS — Z47.1 AFTERCARE FOLLOWING LEFT SHOULDER JOINT REPLACEMENT SURGERY: Primary | ICD-10-CM

## 2020-10-08 DIAGNOSIS — Z96.612 AFTERCARE FOLLOWING LEFT SHOULDER JOINT REPLACEMENT SURGERY: Primary | ICD-10-CM

## 2020-10-08 PROCEDURE — 97140 MANUAL THERAPY 1/> REGIONS: CPT | Performed by: PHYSICAL THERAPIST

## 2020-10-08 PROCEDURE — 97750 PHYSICAL PERFORMANCE TEST: CPT | Performed by: PHYSICAL THERAPIST

## 2020-10-08 PROCEDURE — 97110 THERAPEUTIC EXERCISES: CPT | Performed by: PHYSICAL THERAPIST

## 2020-10-12 ENCOUNTER — OFFICE VISIT (OUTPATIENT)
Dept: PHYSICAL THERAPY | Age: 75
End: 2020-10-12
Payer: COMMERCIAL

## 2020-10-12 DIAGNOSIS — Z47.1 AFTERCARE FOLLOWING LEFT SHOULDER JOINT REPLACEMENT SURGERY: Primary | ICD-10-CM

## 2020-10-12 DIAGNOSIS — Z96.612 STATUS POST TOTAL SHOULDER REPLACEMENT, LEFT: ICD-10-CM

## 2020-10-12 DIAGNOSIS — Z96.612 AFTERCARE FOLLOWING LEFT SHOULDER JOINT REPLACEMENT SURGERY: Primary | ICD-10-CM

## 2020-10-12 PROCEDURE — 97140 MANUAL THERAPY 1/> REGIONS: CPT | Performed by: PHYSICAL THERAPIST

## 2020-10-12 PROCEDURE — 97110 THERAPEUTIC EXERCISES: CPT | Performed by: PHYSICAL THERAPIST

## 2020-10-15 ENCOUNTER — TRANSCRIBE ORDERS (OUTPATIENT)
Dept: PHYSICAL THERAPY | Age: 75
End: 2020-10-15

## 2020-10-15 ENCOUNTER — OFFICE VISIT (OUTPATIENT)
Dept: PHYSICAL THERAPY | Age: 75
End: 2020-10-15
Payer: COMMERCIAL

## 2020-10-15 DIAGNOSIS — Z96.612 STATUS POST TOTAL SHOULDER REPLACEMENT, LEFT: ICD-10-CM

## 2020-10-15 DIAGNOSIS — Z96.612 STATUS POST TOTAL REPLACEMENT OF LEFT SHOULDER: Primary | ICD-10-CM

## 2020-10-15 DIAGNOSIS — Z96.612 AFTERCARE FOLLOWING LEFT SHOULDER JOINT REPLACEMENT SURGERY: Primary | ICD-10-CM

## 2020-10-15 DIAGNOSIS — Z47.1 AFTERCARE FOLLOWING LEFT SHOULDER JOINT REPLACEMENT SURGERY: Primary | ICD-10-CM

## 2020-10-15 PROCEDURE — 97140 MANUAL THERAPY 1/> REGIONS: CPT | Performed by: PHYSICAL THERAPIST

## 2020-10-15 PROCEDURE — 97110 THERAPEUTIC EXERCISES: CPT | Performed by: PHYSICAL THERAPIST

## 2020-10-19 ENCOUNTER — OFFICE VISIT (OUTPATIENT)
Dept: PHYSICAL THERAPY | Age: 75
End: 2020-10-19
Payer: COMMERCIAL

## 2020-10-19 DIAGNOSIS — Z47.1 AFTERCARE FOLLOWING LEFT SHOULDER JOINT REPLACEMENT SURGERY: Primary | ICD-10-CM

## 2020-10-19 DIAGNOSIS — Z96.612 STATUS POST TOTAL SHOULDER REPLACEMENT, LEFT: ICD-10-CM

## 2020-10-19 DIAGNOSIS — Z96.612 AFTERCARE FOLLOWING LEFT SHOULDER JOINT REPLACEMENT SURGERY: Primary | ICD-10-CM

## 2020-10-19 PROCEDURE — 97140 MANUAL THERAPY 1/> REGIONS: CPT

## 2020-10-19 PROCEDURE — 97110 THERAPEUTIC EXERCISES: CPT

## 2020-10-22 ENCOUNTER — OFFICE VISIT (OUTPATIENT)
Dept: PHYSICAL THERAPY | Age: 75
End: 2020-10-22
Payer: COMMERCIAL

## 2020-10-22 DIAGNOSIS — Z96.612 AFTERCARE FOLLOWING LEFT SHOULDER JOINT REPLACEMENT SURGERY: Primary | ICD-10-CM

## 2020-10-22 DIAGNOSIS — Z96.612 STATUS POST TOTAL SHOULDER REPLACEMENT, LEFT: ICD-10-CM

## 2020-10-22 DIAGNOSIS — Z47.1 AFTERCARE FOLLOWING LEFT SHOULDER JOINT REPLACEMENT SURGERY: Primary | ICD-10-CM

## 2020-10-22 PROCEDURE — 97110 THERAPEUTIC EXERCISES: CPT

## 2020-10-26 ENCOUNTER — APPOINTMENT (OUTPATIENT)
Dept: PHYSICAL THERAPY | Age: 75
End: 2020-10-26
Payer: COMMERCIAL

## 2020-10-27 ENCOUNTER — OFFICE VISIT (OUTPATIENT)
Dept: PHYSICAL THERAPY | Age: 75
End: 2020-10-27
Payer: COMMERCIAL

## 2020-10-27 DIAGNOSIS — Z47.1 AFTERCARE FOLLOWING LEFT SHOULDER JOINT REPLACEMENT SURGERY: Primary | ICD-10-CM

## 2020-10-27 DIAGNOSIS — Z96.612 AFTERCARE FOLLOWING LEFT SHOULDER JOINT REPLACEMENT SURGERY: Primary | ICD-10-CM

## 2020-10-27 DIAGNOSIS — Z96.612 STATUS POST TOTAL SHOULDER REPLACEMENT, LEFT: ICD-10-CM

## 2020-10-27 PROCEDURE — 97140 MANUAL THERAPY 1/> REGIONS: CPT

## 2020-10-27 PROCEDURE — 97110 THERAPEUTIC EXERCISES: CPT

## 2020-10-29 ENCOUNTER — OFFICE VISIT (OUTPATIENT)
Dept: PHYSICAL THERAPY | Age: 75
End: 2020-10-29
Payer: COMMERCIAL

## 2020-10-29 DIAGNOSIS — Z96.612 STATUS POST TOTAL SHOULDER REPLACEMENT, LEFT: ICD-10-CM

## 2020-10-29 DIAGNOSIS — Z96.612 AFTERCARE FOLLOWING LEFT SHOULDER JOINT REPLACEMENT SURGERY: Primary | ICD-10-CM

## 2020-10-29 DIAGNOSIS — Z47.1 AFTERCARE FOLLOWING LEFT SHOULDER JOINT REPLACEMENT SURGERY: Primary | ICD-10-CM

## 2020-10-29 PROCEDURE — 97140 MANUAL THERAPY 1/> REGIONS: CPT

## 2020-10-29 PROCEDURE — 97110 THERAPEUTIC EXERCISES: CPT

## 2020-11-02 ENCOUNTER — APPOINTMENT (OUTPATIENT)
Dept: PHYSICAL THERAPY | Age: 75
End: 2020-11-02
Payer: COMMERCIAL

## 2020-11-03 ENCOUNTER — OFFICE VISIT (OUTPATIENT)
Dept: PHYSICAL THERAPY | Age: 75
End: 2020-11-03
Payer: COMMERCIAL

## 2020-11-03 DIAGNOSIS — Z96.612 AFTERCARE FOLLOWING LEFT SHOULDER JOINT REPLACEMENT SURGERY: Primary | ICD-10-CM

## 2020-11-03 DIAGNOSIS — Z96.612 STATUS POST TOTAL SHOULDER REPLACEMENT, LEFT: ICD-10-CM

## 2020-11-03 DIAGNOSIS — Z47.1 AFTERCARE FOLLOWING LEFT SHOULDER JOINT REPLACEMENT SURGERY: Primary | ICD-10-CM

## 2020-11-03 PROCEDURE — 97110 THERAPEUTIC EXERCISES: CPT

## 2020-11-03 PROCEDURE — 97140 MANUAL THERAPY 1/> REGIONS: CPT

## 2020-11-05 ENCOUNTER — OFFICE VISIT (OUTPATIENT)
Dept: PHYSICAL THERAPY | Age: 75
End: 2020-11-05
Payer: COMMERCIAL

## 2020-11-05 DIAGNOSIS — Z96.612 STATUS POST TOTAL SHOULDER REPLACEMENT, LEFT: ICD-10-CM

## 2020-11-05 DIAGNOSIS — Z47.1 AFTERCARE FOLLOWING LEFT SHOULDER JOINT REPLACEMENT SURGERY: Primary | ICD-10-CM

## 2020-11-05 DIAGNOSIS — Z96.612 AFTERCARE FOLLOWING LEFT SHOULDER JOINT REPLACEMENT SURGERY: Primary | ICD-10-CM

## 2020-11-05 PROCEDURE — 97140 MANUAL THERAPY 1/> REGIONS: CPT

## 2020-11-05 PROCEDURE — 97110 THERAPEUTIC EXERCISES: CPT

## 2020-11-09 ENCOUNTER — APPOINTMENT (OUTPATIENT)
Dept: PHYSICAL THERAPY | Age: 75
End: 2020-11-09
Payer: COMMERCIAL

## 2020-11-10 ENCOUNTER — OFFICE VISIT (OUTPATIENT)
Dept: PHYSICAL THERAPY | Age: 75
End: 2020-11-10
Payer: COMMERCIAL

## 2020-11-10 DIAGNOSIS — Z96.612 AFTERCARE FOLLOWING LEFT SHOULDER JOINT REPLACEMENT SURGERY: Primary | ICD-10-CM

## 2020-11-10 DIAGNOSIS — Z47.1 AFTERCARE FOLLOWING LEFT SHOULDER JOINT REPLACEMENT SURGERY: Primary | ICD-10-CM

## 2020-11-10 DIAGNOSIS — Z96.612 STATUS POST TOTAL SHOULDER REPLACEMENT, LEFT: ICD-10-CM

## 2020-11-10 PROCEDURE — 97750 PHYSICAL PERFORMANCE TEST: CPT | Performed by: PHYSICAL THERAPIST

## 2020-11-10 PROCEDURE — 97140 MANUAL THERAPY 1/> REGIONS: CPT | Performed by: PHYSICAL THERAPIST

## 2020-11-10 PROCEDURE — 97110 THERAPEUTIC EXERCISES: CPT | Performed by: PHYSICAL THERAPIST

## 2020-11-12 ENCOUNTER — OFFICE VISIT (OUTPATIENT)
Dept: PHYSICAL THERAPY | Age: 75
End: 2020-11-12
Payer: COMMERCIAL

## 2020-11-12 DIAGNOSIS — Z96.612 STATUS POST TOTAL SHOULDER REPLACEMENT, LEFT: ICD-10-CM

## 2020-11-12 DIAGNOSIS — Z47.1 AFTERCARE FOLLOWING LEFT SHOULDER JOINT REPLACEMENT SURGERY: Primary | ICD-10-CM

## 2020-11-12 DIAGNOSIS — Z96.612 AFTERCARE FOLLOWING LEFT SHOULDER JOINT REPLACEMENT SURGERY: Primary | ICD-10-CM

## 2020-11-12 PROCEDURE — 97140 MANUAL THERAPY 1/> REGIONS: CPT

## 2020-11-12 PROCEDURE — 97110 THERAPEUTIC EXERCISES: CPT

## 2020-11-17 ENCOUNTER — OFFICE VISIT (OUTPATIENT)
Dept: PHYSICAL THERAPY | Age: 75
End: 2020-11-17
Payer: COMMERCIAL

## 2020-11-17 DIAGNOSIS — Z47.1 AFTERCARE FOLLOWING LEFT SHOULDER JOINT REPLACEMENT SURGERY: Primary | ICD-10-CM

## 2020-11-17 DIAGNOSIS — Z96.612 STATUS POST TOTAL SHOULDER REPLACEMENT, LEFT: ICD-10-CM

## 2020-11-17 DIAGNOSIS — Z96.612 AFTERCARE FOLLOWING LEFT SHOULDER JOINT REPLACEMENT SURGERY: Primary | ICD-10-CM

## 2020-11-17 PROCEDURE — 97140 MANUAL THERAPY 1/> REGIONS: CPT

## 2020-11-17 PROCEDURE — 97110 THERAPEUTIC EXERCISES: CPT

## 2020-11-19 ENCOUNTER — OFFICE VISIT (OUTPATIENT)
Dept: PHYSICAL THERAPY | Age: 75
End: 2020-11-19
Payer: COMMERCIAL

## 2020-11-19 DIAGNOSIS — Z47.1 AFTERCARE FOLLOWING LEFT SHOULDER JOINT REPLACEMENT SURGERY: Primary | ICD-10-CM

## 2020-11-19 DIAGNOSIS — Z96.612 STATUS POST TOTAL SHOULDER REPLACEMENT, LEFT: ICD-10-CM

## 2020-11-19 DIAGNOSIS — Z96.612 AFTERCARE FOLLOWING LEFT SHOULDER JOINT REPLACEMENT SURGERY: Primary | ICD-10-CM

## 2020-11-19 PROCEDURE — 97140 MANUAL THERAPY 1/> REGIONS: CPT

## 2020-11-19 PROCEDURE — 97110 THERAPEUTIC EXERCISES: CPT

## 2020-11-23 ENCOUNTER — OFFICE VISIT (OUTPATIENT)
Dept: PHYSICAL THERAPY | Age: 75
End: 2020-11-23
Payer: COMMERCIAL

## 2020-11-23 DIAGNOSIS — Z96.612 AFTERCARE FOLLOWING LEFT SHOULDER JOINT REPLACEMENT SURGERY: Primary | ICD-10-CM

## 2020-11-23 DIAGNOSIS — Z96.612 STATUS POST TOTAL SHOULDER REPLACEMENT, LEFT: ICD-10-CM

## 2020-11-23 DIAGNOSIS — Z47.1 AFTERCARE FOLLOWING LEFT SHOULDER JOINT REPLACEMENT SURGERY: Primary | ICD-10-CM

## 2020-11-23 PROCEDURE — 97110 THERAPEUTIC EXERCISES: CPT | Performed by: PHYSICAL THERAPIST

## 2020-11-23 PROCEDURE — 97140 MANUAL THERAPY 1/> REGIONS: CPT | Performed by: PHYSICAL THERAPIST

## 2020-11-24 ENCOUNTER — APPOINTMENT (OUTPATIENT)
Dept: PHYSICAL THERAPY | Age: 75
End: 2020-11-24
Payer: COMMERCIAL

## 2020-11-27 ENCOUNTER — OFFICE VISIT (OUTPATIENT)
Dept: PHYSICAL THERAPY | Age: 75
End: 2020-11-27
Payer: COMMERCIAL

## 2020-11-27 DIAGNOSIS — Z47.1 AFTERCARE FOLLOWING LEFT SHOULDER JOINT REPLACEMENT SURGERY: Primary | ICD-10-CM

## 2020-11-27 DIAGNOSIS — Z96.612 AFTERCARE FOLLOWING LEFT SHOULDER JOINT REPLACEMENT SURGERY: Primary | ICD-10-CM

## 2020-11-27 DIAGNOSIS — Z96.612 STATUS POST TOTAL SHOULDER REPLACEMENT, LEFT: ICD-10-CM

## 2020-11-27 PROCEDURE — 97110 THERAPEUTIC EXERCISES: CPT

## 2020-11-27 PROCEDURE — 97140 MANUAL THERAPY 1/> REGIONS: CPT

## 2020-11-30 ENCOUNTER — OFFICE VISIT (OUTPATIENT)
Dept: PHYSICAL THERAPY | Age: 75
End: 2020-11-30
Payer: COMMERCIAL

## 2020-11-30 DIAGNOSIS — Z96.612 STATUS POST TOTAL SHOULDER REPLACEMENT, LEFT: ICD-10-CM

## 2020-11-30 DIAGNOSIS — Z96.612 AFTERCARE FOLLOWING LEFT SHOULDER JOINT REPLACEMENT SURGERY: Primary | ICD-10-CM

## 2020-11-30 DIAGNOSIS — Z47.1 AFTERCARE FOLLOWING LEFT SHOULDER JOINT REPLACEMENT SURGERY: Primary | ICD-10-CM

## 2020-11-30 PROCEDURE — 97110 THERAPEUTIC EXERCISES: CPT | Performed by: PHYSICAL THERAPIST

## 2020-11-30 PROCEDURE — 97140 MANUAL THERAPY 1/> REGIONS: CPT | Performed by: PHYSICAL THERAPIST

## 2020-12-01 ENCOUNTER — APPOINTMENT (OUTPATIENT)
Dept: PHYSICAL THERAPY | Age: 75
End: 2020-12-01
Payer: COMMERCIAL

## 2020-12-03 ENCOUNTER — OFFICE VISIT (OUTPATIENT)
Dept: PHYSICAL THERAPY | Age: 75
End: 2020-12-03
Payer: COMMERCIAL

## 2020-12-03 DIAGNOSIS — Z96.612 AFTERCARE FOLLOWING LEFT SHOULDER JOINT REPLACEMENT SURGERY: Primary | ICD-10-CM

## 2020-12-03 DIAGNOSIS — Z47.1 AFTERCARE FOLLOWING LEFT SHOULDER JOINT REPLACEMENT SURGERY: Primary | ICD-10-CM

## 2020-12-03 DIAGNOSIS — Z96.612 STATUS POST TOTAL SHOULDER REPLACEMENT, LEFT: ICD-10-CM

## 2020-12-03 PROCEDURE — 97140 MANUAL THERAPY 1/> REGIONS: CPT

## 2020-12-03 PROCEDURE — 97110 THERAPEUTIC EXERCISES: CPT

## 2020-12-07 ENCOUNTER — OFFICE VISIT (OUTPATIENT)
Dept: PHYSICAL THERAPY | Age: 75
End: 2020-12-07
Payer: COMMERCIAL

## 2020-12-07 DIAGNOSIS — Z96.612 STATUS POST TOTAL SHOULDER REPLACEMENT, LEFT: ICD-10-CM

## 2020-12-07 DIAGNOSIS — Z47.1 AFTERCARE FOLLOWING LEFT SHOULDER JOINT REPLACEMENT SURGERY: Primary | ICD-10-CM

## 2020-12-07 DIAGNOSIS — Z96.612 AFTERCARE FOLLOWING LEFT SHOULDER JOINT REPLACEMENT SURGERY: Primary | ICD-10-CM

## 2020-12-07 PROCEDURE — 97140 MANUAL THERAPY 1/> REGIONS: CPT

## 2020-12-07 PROCEDURE — 97110 THERAPEUTIC EXERCISES: CPT

## 2020-12-08 ENCOUNTER — TRANSCRIBE ORDERS (OUTPATIENT)
Dept: PHYSICAL THERAPY | Age: 75
End: 2020-12-08

## 2020-12-08 DIAGNOSIS — Z47.1 AFTERCARE FOLLOWING SHOULDER JOINT REPLACEMENT SURGERY, UNSPECIFIED LATERALITY: Primary | ICD-10-CM

## 2020-12-08 DIAGNOSIS — Z96.619 AFTERCARE FOLLOWING SHOULDER JOINT REPLACEMENT SURGERY, UNSPECIFIED LATERALITY: Primary | ICD-10-CM

## 2020-12-10 ENCOUNTER — EVALUATION (OUTPATIENT)
Dept: PHYSICAL THERAPY | Age: 75
End: 2020-12-10
Payer: COMMERCIAL

## 2020-12-10 DIAGNOSIS — Z96.612 AFTERCARE FOLLOWING LEFT SHOULDER JOINT REPLACEMENT SURGERY: Primary | ICD-10-CM

## 2020-12-10 DIAGNOSIS — Z96.612 STATUS POST TOTAL SHOULDER REPLACEMENT, LEFT: ICD-10-CM

## 2020-12-10 DIAGNOSIS — Z47.1 AFTERCARE FOLLOWING LEFT SHOULDER JOINT REPLACEMENT SURGERY: Primary | ICD-10-CM

## 2020-12-10 PROCEDURE — 97750 PHYSICAL PERFORMANCE TEST: CPT | Performed by: PHYSICAL THERAPIST

## 2020-12-10 PROCEDURE — 97110 THERAPEUTIC EXERCISES: CPT | Performed by: PHYSICAL THERAPIST

## 2020-12-10 PROCEDURE — 97140 MANUAL THERAPY 1/> REGIONS: CPT | Performed by: PHYSICAL THERAPIST

## 2020-12-14 ENCOUNTER — OFFICE VISIT (OUTPATIENT)
Dept: PHYSICAL THERAPY | Age: 75
End: 2020-12-14
Payer: COMMERCIAL

## 2020-12-14 DIAGNOSIS — Z47.1 AFTERCARE FOLLOWING LEFT SHOULDER JOINT REPLACEMENT SURGERY: Primary | ICD-10-CM

## 2020-12-14 DIAGNOSIS — Z96.612 AFTERCARE FOLLOWING LEFT SHOULDER JOINT REPLACEMENT SURGERY: Primary | ICD-10-CM

## 2020-12-14 DIAGNOSIS — Z96.612 STATUS POST TOTAL SHOULDER REPLACEMENT, LEFT: ICD-10-CM

## 2020-12-14 PROCEDURE — 97140 MANUAL THERAPY 1/> REGIONS: CPT | Performed by: PHYSICAL THERAPIST

## 2020-12-14 PROCEDURE — 97110 THERAPEUTIC EXERCISES: CPT | Performed by: PHYSICAL THERAPIST

## 2020-12-15 ENCOUNTER — OFFICE VISIT (OUTPATIENT)
Dept: PHYSICAL THERAPY | Age: 75
End: 2020-12-15
Payer: COMMERCIAL

## 2020-12-15 DIAGNOSIS — Z96.612 STATUS POST TOTAL SHOULDER REPLACEMENT, LEFT: Primary | ICD-10-CM

## 2020-12-15 PROCEDURE — 97110 THERAPEUTIC EXERCISES: CPT | Performed by: PHYSICAL THERAPIST

## 2020-12-15 PROCEDURE — 97140 MANUAL THERAPY 1/> REGIONS: CPT | Performed by: PHYSICAL THERAPIST

## 2020-12-16 ENCOUNTER — APPOINTMENT (OUTPATIENT)
Dept: PHYSICAL THERAPY | Age: 75
End: 2020-12-16
Payer: COMMERCIAL

## 2020-12-21 ENCOUNTER — OFFICE VISIT (OUTPATIENT)
Dept: PHYSICAL THERAPY | Age: 75
End: 2020-12-21
Payer: COMMERCIAL

## 2020-12-21 DIAGNOSIS — Z96.612 AFTERCARE FOLLOWING LEFT SHOULDER JOINT REPLACEMENT SURGERY: ICD-10-CM

## 2020-12-21 DIAGNOSIS — Z96.612 STATUS POST TOTAL SHOULDER REPLACEMENT, LEFT: Primary | ICD-10-CM

## 2020-12-21 DIAGNOSIS — Z47.1 AFTERCARE FOLLOWING LEFT SHOULDER JOINT REPLACEMENT SURGERY: ICD-10-CM

## 2020-12-21 PROCEDURE — 97140 MANUAL THERAPY 1/> REGIONS: CPT | Performed by: PHYSICAL THERAPIST

## 2020-12-21 PROCEDURE — 97110 THERAPEUTIC EXERCISES: CPT | Performed by: PHYSICAL THERAPIST

## 2020-12-23 ENCOUNTER — OFFICE VISIT (OUTPATIENT)
Dept: PHYSICAL THERAPY | Age: 75
End: 2020-12-23
Payer: COMMERCIAL

## 2020-12-23 DIAGNOSIS — Z96.612 STATUS POST TOTAL SHOULDER REPLACEMENT, LEFT: Primary | ICD-10-CM

## 2020-12-23 DIAGNOSIS — Z96.612 AFTERCARE FOLLOWING LEFT SHOULDER JOINT REPLACEMENT SURGERY: ICD-10-CM

## 2020-12-23 DIAGNOSIS — Z47.1 AFTERCARE FOLLOWING LEFT SHOULDER JOINT REPLACEMENT SURGERY: ICD-10-CM

## 2020-12-23 PROCEDURE — 97140 MANUAL THERAPY 1/> REGIONS: CPT | Performed by: PHYSICAL THERAPIST

## 2020-12-23 PROCEDURE — 97110 THERAPEUTIC EXERCISES: CPT | Performed by: PHYSICAL THERAPIST

## 2020-12-28 ENCOUNTER — OFFICE VISIT (OUTPATIENT)
Dept: PHYSICAL THERAPY | Age: 75
End: 2020-12-28
Payer: COMMERCIAL

## 2020-12-28 DIAGNOSIS — Z96.612 AFTERCARE FOLLOWING LEFT SHOULDER JOINT REPLACEMENT SURGERY: ICD-10-CM

## 2020-12-28 DIAGNOSIS — Z96.612 STATUS POST TOTAL SHOULDER REPLACEMENT, LEFT: Primary | ICD-10-CM

## 2020-12-28 DIAGNOSIS — Z47.1 AFTERCARE FOLLOWING LEFT SHOULDER JOINT REPLACEMENT SURGERY: ICD-10-CM

## 2020-12-28 PROCEDURE — 97140 MANUAL THERAPY 1/> REGIONS: CPT | Performed by: PHYSICAL THERAPIST

## 2020-12-28 PROCEDURE — 97110 THERAPEUTIC EXERCISES: CPT | Performed by: PHYSICAL THERAPIST

## 2020-12-30 ENCOUNTER — OFFICE VISIT (OUTPATIENT)
Dept: PHYSICAL THERAPY | Age: 75
End: 2020-12-30
Payer: COMMERCIAL

## 2020-12-30 DIAGNOSIS — Z96.612 H/O TOTAL SHOULDER REPLACEMENT, LEFT: Primary | ICD-10-CM

## 2020-12-30 PROCEDURE — 97110 THERAPEUTIC EXERCISES: CPT

## 2020-12-30 PROCEDURE — 97140 MANUAL THERAPY 1/> REGIONS: CPT

## 2021-01-04 ENCOUNTER — OFFICE VISIT (OUTPATIENT)
Dept: PHYSICAL THERAPY | Age: 76
End: 2021-01-04
Payer: MEDICARE

## 2021-01-04 DIAGNOSIS — Z96.612 AFTERCARE FOLLOWING LEFT SHOULDER JOINT REPLACEMENT SURGERY: ICD-10-CM

## 2021-01-04 DIAGNOSIS — Z96.612 H/O TOTAL SHOULDER REPLACEMENT, LEFT: Primary | ICD-10-CM

## 2021-01-04 DIAGNOSIS — Z47.1 AFTERCARE FOLLOWING LEFT SHOULDER JOINT REPLACEMENT SURGERY: ICD-10-CM

## 2021-01-04 DIAGNOSIS — Z96.612 STATUS POST TOTAL SHOULDER REPLACEMENT, LEFT: ICD-10-CM

## 2021-01-04 PROCEDURE — 97140 MANUAL THERAPY 1/> REGIONS: CPT

## 2021-01-04 PROCEDURE — 97110 THERAPEUTIC EXERCISES: CPT

## 2021-01-04 NOTE — PROGRESS NOTES
Daily Note     Today's date: 2021  Patient name: Tanner Ma  : 1945  MRN: [id number]  Referring provider: Fara Wood MD  Dx:   Encounter Diagnosis     ICD-10-CM    1  H/O total shoulder replacement, left  L64 [id number]    2  Status post total shoulder replacement, left  Z96 612    3  Aftercare following left shoulder joint replacement surgery  Z47 1     Z96 [id number]                   Subjective: Pt reports no pain       Objective: See treatment diary below         Assessment: Pt presents with limited IR, progress as able       Plan: Continue per plan of care  Progress treatment as tolerated         Precautions: s/p LEFT TSA on 2020:   Weeks 8 initiate Strength as per protocol NO MORE THAN 5 LBS      Patient's PMHx is remarkable for HTN with 40 mg of Lisinopril and taking metformin 500 mg 2 x per day, and restless leg syndrome and is taking Requip 2 x per day and bilateral TKA                Manuals  11/30  12/3 12/7 12/10 12/15 12/21 12/23 12/28 12/30 1/4/21                Prom left GH joint with specif restrictions noted above  10 min  10 min 10 min  10 min 10' 10 min 10 min 10 min 10 min GPF 10 min                 There ex              shoulder IR stretch against wall:L:  NT  NT 10 sec x 5 against wall and with strap 20 sec x 5 np 10 sec x 5 against wall and 20 sec x 5 with strap 20 sec x 5 on each 20 sec x 5  20 sec x 5  20sec 5x    IR St S/L L   10x 5sec  NT NT         Sleeper's st on L    10sec 5x NT                      Pulleys  8 min  8 min  8 min  8 min 8 min 8 min 8 min 8 min 8 min 8 min   Wall slides  20x  20X  20x 2 x 10 flexion and scaption 2 x 10 flexion and scaption 2 x 10 each 2 x 10 2 x 10 2 x 10    -> Serratus wall slides w/towel 2x10 20x                                 tband rows and extension:B: Red x 20 BTB 20X  BTB 20X  NT BTB 20X BTB x 20 BTB x 20 BTB x 20  BTB 20X    tband IR and ER:L Red x 20  BTB 20X  BTB   20X NT np NT NT Red x 20  BTB 20X    Standing flex/ scap  20x with 3# 20X 2#  NT 2# x 20 3# x20 2# x 20 2# x 20 2# x 20  20X 3#    Supine cane flex/ chest press  30x 4# 20X 4#  20X 4#  4# x 20 np 5# x 20 5# x 20 5# x 20  30X 4#    Supine scap punches  30x 4# 20X 3#  4# 20x 3# x 20 3# x20 3# x 20 3# x 20 3# x 20  20X 3#    Supine triceps 30x 4# 20X 3#  4# 20x 3# x 20 3# x 20 3# x 20 3# x 20 3# x 20  20X 3#    Biceps    20x 4#  5# x 20 5# x 20 5# x 20 5# x 20 5# x 20  20X 4#    S/L ER    30X 2#  20X 2#  20x 4#  3# x 20 3# x 20 3# x 20 3# x 20      S/L abd   20 x  20X  20x 4#  3# x 20 3# x 20 3# x 20 3# x 20 3# x 20 3# x 20 20X 3#                 Prone abd with palm down L  20X 2#  20X 2# 20x 2#  NT 2# x 20 2# x 20 2# x 20 2# x 20  20X 3#    Prone rows 20x 2# 20X 2#  20x 2#  NT 2# x 20 2# x 20 2# x 20 NT     Prone ext  20X 2#  20X 2#  20x 2#  NT 2# x 20 2# x 20 2# x 20 2# x 20 2# x 20 20X 3#    Prone scap  20X 2#  20X 2#  20x 2#  NT 2# x 20 2# x 20 2# x 20 2# x 20 2# x 20 20X 3#    Standing Flex, Scaption, Abd w/towel roll         2x10 ea 20X                                           Modalities                            CP to left [address]   Joint with Pt seated   D/C     10' 10 min NT

## 2021-01-06 ENCOUNTER — OFFICE VISIT (OUTPATIENT)
Dept: PHYSICAL THERAPY | Age: 76
End: 2021-01-06
Payer: MEDICARE

## 2021-01-06 DIAGNOSIS — Z47.1 AFTERCARE FOLLOWING LEFT SHOULDER JOINT REPLACEMENT SURGERY: ICD-10-CM

## 2021-01-06 DIAGNOSIS — Z96.612 H/O TOTAL SHOULDER REPLACEMENT, LEFT: Primary | ICD-10-CM

## 2021-01-06 DIAGNOSIS — Z96.612 AFTERCARE FOLLOWING LEFT SHOULDER JOINT REPLACEMENT SURGERY: ICD-10-CM

## 2021-01-06 DIAGNOSIS — Z96.612 STATUS POST TOTAL SHOULDER REPLACEMENT, LEFT: ICD-10-CM

## 2021-01-06 PROCEDURE — 97750 PHYSICAL PERFORMANCE TEST: CPT | Performed by: PHYSICAL THERAPIST

## 2021-01-06 PROCEDURE — 97140 MANUAL THERAPY 1/> REGIONS: CPT | Performed by: PHYSICAL THERAPIST

## 2021-01-06 PROCEDURE — 97110 THERAPEUTIC EXERCISES: CPT | Performed by: PHYSICAL THERAPIST

## 2021-01-06 NOTE — PROGRESS NOTES
PT Evaluation / PT Reassessment    Today's date: 2021  Patient name: Marta Mariscal  : 1945  MRN: [id number]  Referring provider: Devin Barfield MD  Dx:   Encounter Diagnosis     ICD-10-CM    1  H/O total shoulder replacement, left  P68 [id number]    2  Status post total shoulder replacement, left  Z96 612    3  Aftercare following left shoulder joint replacement surgery  Z47 1     Z96 [id number]                   Assessment  Assessment details: PT Reassessment: 2021  Patient reported the following progress since onset of PT: left ue mobility, left ue strength, able to close the car door with left ue, reach higher to grab items, dressing improved and pain reduced  But, she noted the following deficits that still persist: donning and doffing the seat belt, reaching into full elevation limited, lifting with left ue, difficulty with the ability to wash and dry her back with her left ue  PT Reassessment:  12/10/2020  Patient reported the following progress since onset of PT: able to use left shoulder to close the car door, increase in left ue mobility and strength, opening the refrigerator door, pulling her pants up, getting tops on, sleep improved, decrease in pain  Patient noted the following deficits since onset of PT: use her left arm on the top of her head to use curlers and do her hair, reaching over head height, lifting items over head, lifting moderate activities, donning and doffing the seat belt with her left arom while she is driving and left ue weakness and fatigue with iadls and self functional activities  Plus, she noted she gets an intermittent left anterior shoulder " twinge of pain"  PT Reassessment: 11/10/2020    Patient noted the following progress since onset of PT: decrease in left shoulder pain, increase in left upper extremity mobility and strength, able to use left upper extremity to pull her seat belt down and on, open refrigerator door, pulling up pants in the back, pull up the covers while in bed and showering without deficits or difficulties  But, she noted the deficits still persist: reaching above shoulder height, lifting, lifting up groceries and a foot items like a gallon of milk, helping her  while is medically compromised, inability to lie in left side lying with arm elevated which limits her sleep, assisting her spouse in his iadls and showering, full reaching back and down to adjust her torso and le clothing dressing activities, left ue weakness,stiffness and mobility deficits and functional deficits  PT Reassessment: 10/08/2020  Patient reported the following progress since onset of left TSA and PT: left shoulder pain, able to resume sitting in her recliner without pain, improvement with self iadls and grooming, showering, and dressing improved  Patient noted the following deficits still persist: doing her hair / grooming, taking care of her her elder , reaching, lifting, pulling up her pants in the back  Patient noted doing her hair and pulling up her pants produced left shoulder pain at 3 of 10  PT IE: 08/28/2020  Patient had left TSA on 08/26/2020  Patient has the following movement limitations as per surgeon: max ER at 30 degrees, NO active IR to protect the subscapularis, wear sling for 4 weeks ( until 9/26/2020) aarom flexion at 110 and active elbow wrist and hand movements to pt tolerance  Patient noted she as nauseated yesterday which she feels is due to anesthesia  Patient noted she had a standard TSA on 8/26/2020  Patient noted prior to surgery left GH joint was painful with elevation based movements which worsened and then due to inactivity due to COVID her left shoulder pain worsened  Patient noted prior to surgical intervention she will have intermittent left clavicle to neck to jaw pain which was present when overall activity during the day increased  Patient noted she is right ue dominant    Patient noted she still has left 1 and 2 finger tingling sensation due to left ue nerve block which she noted yesterday all 5 fingers were tingling  Patient denies left hand edema  Patient noted she was able to sleep in a recliner due to sleep in a bed not occurring due to extreme difficulties getting in and out of bed  Patient noted she returns to Dr Brandon Mike in one month  Patient noted the day after surgery she was instructed to not move her left shoulder  Patient noted she requires assistance of getting dressed as well as sling donning and doffing  Impairments: abnormal or restricted ROM, abnormal movement, activity intolerance, lacks appropriate home exercise program and pain with function  Understanding of Dx/Px/POC: excellent   Prognosis: good  Prognosis details: Patient is a 76y o  year old female seen for outpatient PT reevaluation with pain, mobility and functional deficits due to s/p left TSA  Patient presents with the following progress since onset of PT: decrease in left shoulder pain, increase in left ue rom and strength, dressing improvements, able to do more for her handicapped , self iadls and functional progress  But, she presents to PT on reassessment with the following problems, concerns, deficits and impairments that still persist: left shoulder region pain, decreased left ue range of motion, decreased left ue strength, reaching across her body to put on seat belt, pulling up her pants, functional limitations with deficits with dressing, self iadls, sleep deprived and decreased tolerance to activity and challenges with pushing  around in his wheel chair  Patient would benefit from skilled PT services under the following PT treatment plan to address the above noted deficits: therapeutic exercises and activities to facilitate left ue rom and strength as per surgeon specific protocols, postural reeducation and strengthening, modalities, manual therapy techniques, IASTM techniques, Kinesio tapings and a hep    Thank you for the referral      Goals  Short Term goals 4 - 6 weeks  1  Patient will be independent HEP  MET  2   Patient will report a 25 - 50% decrease in pain complaints  MET  3   Increase strength 1/2 grade  MET  4   Increase ROM 5-10 degrees  MET  Long Term goals 10 - 12 weeks  1  Patient will report elimination of pain complaints  Partially MET  2   Patient will return to all recreational activities without restriction  Partially MET  3   ROM WFL  Partially MET  4   Strength 5/5  Partially MET  5   Patient will exhibit the ability to sleep in bed  Partially MET  6   Patient will exhibit the ability to dress herself without assistance  MET  7   Patient will exhibit the ability to shower independently  MET  8   Patient will be able to assist  with his activities as she did pre-surgery  Partially MET  9   Patient will be able to drive independently  MET  10   Patient will be able to resume all house hold functional activities  MET  Plan  Patient would benefit from: skilled physical therapy  Planned modality interventions: cryotherapy, TENS, thermotherapy: hydrocollator packs and unattended electrical stimulation  Planned therapy interventions: joint mobilization, manual therapy, massage, neuromuscular re-education, body mechanics training, patient education, postural training, self care, compression, strengthening, stretching, therapeutic activities, therapeutic exercise, therapeutic training, flexibility, functional ROM exercises, graded activity, graded exercise, graded motor and home exercise program  Frequency: 2x week  Duration in weeks: 12  Treatment plan discussed with: patient        Subjective Evaluation    History of Present Illness  Mechanism of injury: Patient's PMHx is remarkable for HTN with 40 mg of Lisinopril and taking metformin 500 mg 2 x per day, and restless leg syndrome and is taking Requip 2 x per day and bilateral TKA    Pain  At best pain ratin  At worst pain rating: 1  Location: left shoulder    Patient Goals  Patient goals for therapy: decreased pain, increased motion, increased strength and independence with ADLs/IADLs          Objective     Tenderness     Additional Tenderness Details  Patient is - TTP at left [facility] joint region  Active Range of Motion   Left Shoulder   Flexion: 146 degrees   Abduction: 130 degrees   External rotation 45°: 75 degrees   Internal rotation 90°: 45 degrees     Right Shoulder   Flexion: 164 degrees   Abduction: 162 degrees   External rotation 90°: 80 degrees  Internal rotation 90°: 55 degrees     Left Elbow   Flexion: 144 degrees   Extension: -5 degrees     Right Elbow   Flexion: 142 degrees   Extension: -5 degrees     Passive Range of Motion   Left Shoulder   Flexion: 162 degrees   Abduction: 140 degrees   External rotation 45°: 82 degrees   Internal rotation 90°: 58 degrees     Strength/Myotome Testing     Left Shoulder     Planes of Motion   Flexion: 4+   Abduction: 4   External rotation at 0°: 4   Internal rotation at 0°: 4+     Right Shoulder     Planes of Motion   Flexion: 5   Abduction: 5   External rotation at 0°: 4+   Internal rotation at 0°: 5     Left Elbow   Flexion: 5  Extension: 4+    Right Elbow   Flexion: 5  Extension: 5    General Comments:      Shoulder Comments   Patient currently has left TSA incision covered with sterile gauze dressing               Precautions: s/p LEFT TSA on 08/26/2020:   Weeks 8 initiate Strength as per protocol NO MORE THAN 5 LBS      Patient's PMHx is remarkable for HTN with 40 mg of Lisinopril and taking metformin 500 mg 2 x per day, and restless leg syndrome and is taking Requip 2 x per day and bilateral TKA                Manuals  1/6/21  12/3 12/7 12/10 12/15 12/21 12/23 12/28 12/30 1/4/21                Prom left GH joint with specif restrictions noted above  10 min  10 min 10 min  10 min 10' 10 min 10 min 10 min 10 min GPF 10 min                 There ex              shoulder IR stretch against wall:L: 20 sec x 5 NT  NT 10 sec x 5 against wall and with strap 20 sec x 5 np 10 sec x 5 against wall and 20 sec x 5 with strap 20 sec x 5 on each 20 sec x 5  20 sec x 5  20sec 5x    IR St S/L L   10x 5sec  NT NT         Sleeper's st on L    10sec 5x NT                      Pulleys  8 min  8 min  8 min  8 min 8 min 8 min 8 min 8 min 8 min 8 min   Wall slides  20x flexion and scaption with 1# 20X  20x 2 x 10 flexion and scaption 2 x 10 flexion and scaption 2 x 10 each 2 x 10 2 x 10 2 x 10    -> Serratus wall slides w/towel 2x10 20x                                 tband rows and extension:B: BTB x 20 BTB 20X  BTB 20X  NT BTB 20X BTB x 20 BTB x 20 BTB x 20  BTB 20X    tband IR and ER:L BTB x 20  BTB 20X  BTB   20X NT np NT NT Red x 20  BTB 20X    Standing flex/ scap  20x with 2# 20X 2#  NT 2# x 20 3# x20 2# x 20 2# x 20 2# x 20  20X 3#    Supine cane flex/ chest press  30x 5# 20X 4#  20X 4#  4# x 20 np 5# x 20 5# x 20 5# x 20  30X 4#    Supine scap punches  30x 4# 20X 3#  4# 20x 3# x 20 3# x20 3# x 20 3# x 20 3# x 20  20X 3#    Supine triceps 30x 4# 20X 3#  4# 20x 3# x 20 3# x 20 3# x 20 3# x 20 3# x 20  20X 3#    Biceps  5#  20x 4#  5# x 20 5# x 20 5# x 20 5# x 20 5# x 20  20X 4#    S/L ER    20X 3#  20X 2#  20x 4#  3# x 20 3# x 20 3# x 20 3# x 20      S/L abd   20 x 3# 20X  20x 4#  3# x 20 3# x 20 3# x 20 3# x 20 3# x 20 3# x 20 20X 3#                 Prone abd with palm down L  20X 2#  20X 2# 20x 2#  NT 2# x 20 2# x 20 2# x 20 2# x 20  20X 3#    Prone rows 20x 2# 20X 2#  20x 2#  NT 2# x 20 2# x 20 2# x 20 NT     Prone ext  20X 2#  20X 2#  20x 2#  NT 2# x 20 2# x 20 2# x 20 2# x 20 2# x 20 20X 3#    Prone scap  20X 2#  20X 2#  20x 2#  NT 2# x 20 2# x 20 2# x 20 2# x 20 2# x 20 20X 3#    Standing Flex, Scaption, Abd w/towel roll         2x10 ea 20X                                           Modalities                            CP to left [address]   Joint with Pt seated   D/C     10' 10 min NT

## 2021-01-11 ENCOUNTER — OFFICE VISIT (OUTPATIENT)
Dept: PHYSICAL THERAPY | Age: 76
End: 2021-01-11
Payer: MEDICARE

## 2021-01-11 DIAGNOSIS — Z47.1 AFTERCARE FOLLOWING LEFT SHOULDER JOINT REPLACEMENT SURGERY: ICD-10-CM

## 2021-01-11 DIAGNOSIS — Z96.612 AFTERCARE FOLLOWING LEFT SHOULDER JOINT REPLACEMENT SURGERY: ICD-10-CM

## 2021-01-11 DIAGNOSIS — Z96.612 H/O TOTAL SHOULDER REPLACEMENT, LEFT: Primary | ICD-10-CM

## 2021-01-11 DIAGNOSIS — Z96.612 STATUS POST TOTAL SHOULDER REPLACEMENT, LEFT: ICD-10-CM

## 2021-01-11 PROCEDURE — 97110 THERAPEUTIC EXERCISES: CPT | Performed by: PHYSICAL THERAPIST

## 2021-01-11 PROCEDURE — 97140 MANUAL THERAPY 1/> REGIONS: CPT | Performed by: PHYSICAL THERAPIST

## 2021-01-11 NOTE — PROGRESS NOTES
Daily Note     Today's date: 2021  Patient name: Emily Naik  : 1945  MRN: [id number]  Referring provider: Magdaleno Katz MD  Dx:   Encounter Diagnosis     ICD-10-CM    1  H/O total shoulder replacement, left  J69 [id number]    2  Status post total shoulder replacement, left  Z96 612    3  Aftercare following left shoulder joint replacement surgery  Z47 1     [id number] [id number]                   Subjective: Patient denies left shoulder pain, but noted left shoulder rotation deficits limit her ability to bala and doff her necklace as well as tuck in the back of her shirt into her pants  Objective: See treatment diary below  Assessment: Patient presents with rotations actively and passively limited which remains her primary functional limitation factor with grooming and dressing activities  Plan: Continue per plan of care  Progress treatment as tolerated              Precautions: s/p LEFT TSA on 2020:   Weeks 8 initiate Strength as per protocol NO MORE THAN 5 LBS      Patient's PMHx is remarkable for HTN with 40 mg of Lisinopril and taking metformin 500 mg 2 x per day, and restless leg syndrome and is taking Requip 2 x per day and bilateral TKA                Manuals  1/6/21  1/11/21 12/7/20 12/10 12/15 12/21 12/23 12/28 12/30 1/4/21                Prom left GH joint with specif restrictions noted above  10 min  10 min 10 min  10 min 10' 10 min 10 min 10 min 10 min GPF 10 min                 There ex              shoulder IR stretch against wall:L: 20 sec x 5 10 sec x 5 NT 10 sec x 5 against wall and with strap 20 sec x 5 np 10 sec x 5 against wall and 20 sec x 5 with strap 20 sec x 5 on each 20 sec x 5  20 sec x 5  20sec 5x    IR St S/L L   NT NT NT         Sleeper's st on L    10sec 5x NT                      Pulleys  8 min  8 min  8 min  8 min 8 min 8 min 8 min 8 min 8 min 8 min   Wall slides  20x flexion and scaption with 1# 20x flexion and scaption with 1# 20x 2 x 10 flexion and scaption 2 x 10 flexion and scaption 2 x 10 each 2 x 10 2 x 10 2 x 10    -> Serratus wall slides w/towel 2x10 20x                                 tband rows and extension:B: BTB x 20 BTB 20X  BTB 20X  NT BTB 20X BTB x 20 BTB x 20 BTB x 20  BTB 20X    tband IR and ER:L BTB x 20  BTB 20X  BTB   20X NT np NT NT Red x 20  BTB 20X    Standing flex/ scap  20x with 2# 20X 2#  NT 2# x 20 3# x20 2# x 20 2# x 20 2# x 20  20X 3#    Supine cane flex/ chest press  30x 5# 20X 4#  20X 4#  4# x 20 np 5# x 20 5# x 20 5# x 20  30X 4#    Supine scap punches  30x 4# 20X 3#  4# 20x 3# x 20 3# x20 3# x 20 3# x 20 3# x 20  20X 3#    Supine triceps 30x 4# 20X 3#  4# 20x 3# x 20 3# x 20 3# x 20 3# x 20 3# x 20  20X 3#    Biceps  5# 5# x 20 20x 4#  5# x 20 5# x 20 5# x 20 5# x 20 5# x 20  20X 4#    S/L ER    20X 3#  20X 3#  20x 4#  3# x 20 3# x 20 3# x 20 3# x 20      S/L abd   20 x 3# 20X 3# 20x 4#  3# x 20 3# x 20 3# x 20 3# x 20 3# x 20 3# x 20 20X 3#                 Prone abd with palm down L  20X 2#  20X 2# 20x 2#  NT 2# x 20 2# x 20 2# x 20 2# x 20  20X 3#    Prone rows 20x 2# 20X 2#  20x 2#  NT 2# x 20 2# x 20 2# x 20 NT     Prone ext  20X 2#  20X 2#  20x 2#  NT 2# x 20 2# x 20 2# x 20 2# x 20 2# x 20 20X 3#    Prone scap  20X 2#  20X 2#  20x 2#  NT 2# x 20 2# x 20 2# x 20 2# x 20 2# x 20 20X 3#    Standing Flex, Scaption, Abd w/towel roll         2x10 ea 20X                                           Modalities                            CP to left [facility]   Joint with Pt seated   D/C     10' 10 min NT

## 2021-01-13 ENCOUNTER — OFFICE VISIT (OUTPATIENT)
Dept: PHYSICAL THERAPY | Age: 76
End: 2021-01-13
Payer: MEDICARE

## 2021-01-13 DIAGNOSIS — Z96.612 AFTERCARE FOLLOWING LEFT SHOULDER JOINT REPLACEMENT SURGERY: ICD-10-CM

## 2021-01-13 DIAGNOSIS — Z47.1 AFTERCARE FOLLOWING LEFT SHOULDER JOINT REPLACEMENT SURGERY: ICD-10-CM

## 2021-01-13 DIAGNOSIS — Z96.612 STATUS POST TOTAL SHOULDER REPLACEMENT, LEFT: ICD-10-CM

## 2021-01-13 DIAGNOSIS — Z96.612 H/O TOTAL SHOULDER REPLACEMENT, LEFT: Primary | ICD-10-CM

## 2021-01-13 PROCEDURE — 97140 MANUAL THERAPY 1/> REGIONS: CPT | Performed by: PHYSICAL THERAPIST

## 2021-01-13 PROCEDURE — 97110 THERAPEUTIC EXERCISES: CPT | Performed by: PHYSICAL THERAPIST

## 2021-01-13 NOTE — PROGRESS NOTES
Daily Note     Today's date: 2021  Patient name: Carlos Louis  : 1945  MRN: [id number]  Referring provider: iNckie Levin MD  Dx:   Encounter Diagnosis     ICD-10-CM    1  H/O total shoulder replacement, left  G50 706    2  Status post total shoulder replacement, left  Z96 612    3  Aftercare following left shoulder joint replacement surgery  Z47 1     Z96 [id number]        Start Time: 0915  Stop Time: 1000  Total time in clinic (min): 45 minutes    Subjective: Patient denies left shoulder pain, but she noted weakness and fatigue limits overhead activities as well as has challenges with the ability to bala and doff her necklace as well as tuck in the back of her shirt into her pants as well as place her seat belt on  Objective: See treatment diary below  Assessment: Patient presents with left [address] joint IR limited by tightness and ER and elevation limited by weakness and fatigue limiting all left ue functional activities  Thus, PT is warranted to promote left GH joint strengthening as well as rotation motion to facilitate long term functional progress  Plan: Continue per plan of care  Progress treatment as tolerated              Precautions: s/p LEFT TSA on 2020:   Weeks 8 initiate Strength as per protocol NO MORE THAN 5 LBS on left le!      Patient's PMHx is remarkable for HTN with 40 mg of Lisinopril and taking metformin 500 mg 2 x per day, and restless leg syndrome and is taking Requip 2 x per day and bilateral TKA                Manuals  1/6/21  1/11/21 1/13/21 12/10 12/15 12/21 12/23 12/28 12/30 1/4/21                Prom left GH joint with specif restrictions noted above  10 min  10 min 10 min  10 min 10' 10 min 10 min 10 min 10 min GPF 10 min                 There ex              shoulder IR stretch against wall:L: 20 sec x 5 10 sec x 5 NT 10 sec x 5 against wall and with strap 20 sec x 5 np 10 sec x 5 against wall and 20 sec x 5 with strap 20 sec x 5 on each 20 sec x 5  20 sec x 5  20sec 5x    IR St S/L L   NT NT NT         Sleeper's st on L    10sec 5x NT                      Pulleys  8 min  8 min  8 min  8 min 8 min 8 min 8 min 8 min 8 min 8 min   Wall slides  20x flexion and scaption with 1# 20x flexion and scaption with 1# 20 x flexion and scaption with 1# 2 x 10 flexion and scaption 2 x 10 flexion and scaption 2 x 10 each 2 x 10 2 x 10 2 x 10    -> Serratus wall slides w/towel 2x10 20x                                 tband rows and extension:B: BTB x 20 BTB 20X  BTB 20X  NT BTB 20X BTB x 20 BTB x 20 BTB x 20  BTB 20X    tband IR and ER:L BTB x 20  BTB 20X  BTB   20X NT np NT NT Red x 20  BTB 20X    Standing flex/ scap  20x with 2# 20X 2#  NT 2# x 20 3# x20 2# x 20 2# x 20 2# x 20  20X 3#    Supine cane flex/ chest press  30x 5# 20X 5#  20X 5#  4# x 20 np 5# x 20 5# x 20 5# x 20  30X 4#    Supine scap punches  30x 4# 20X 3#  4# 20x 3# x 20 3# x20 3# x 20 3# x 20 3# x 20  20X 3#    Supine triceps 30x 4# 20X 3#  4# 20x 3# x 20 3# x 20 3# x 20 3# x 20 3# x 20  20X 3#    Biceps  5# 5# x 20 20x 5#  5# x 20 5# x 20 5# x 20 5# x 20 5# x 20  20X 4#    S/L ER    20X 3#  20X 3#  20x 4#  3# x 20 3# x 20 3# x 20 3# x 20      S/L abd   20 x 3# 20X 3# 20x 4#  3# x 20 3# x 20 3# x 20 3# x 20 3# x 20 3# x 20 20X 3#                 Prone abd with palm down L  20X 2#  20X 2# 20x 2#  NT 2# x 20 2# x 20 2# x 20 2# x 20  20X 3#    Prone rows 20x 2# 20X 2#  20x 2#  NT 2# x 20 2# x 20 2# x 20 NT     Prone ext  20X 2#  20X 2#  20x 2#  NT 2# x 20 2# x 20 2# x 20 2# x 20 2# x 20 20X 3#    Prone scap  20X 2#  20X 2#  20x 2#  NT 2# x 20 2# x 20 2# x 20 2# x 20 2# x 20 20X 3#    Standing Flex, Scaption, Abd w/towel roll         2x10 ea 20X                                           Modalities                            CP to left [address]   Joint with Pt seated   D/C     10' 10 min NT

## 2021-01-18 ENCOUNTER — OFFICE VISIT (OUTPATIENT)
Dept: PHYSICAL THERAPY | Age: 76
End: 2021-01-18
Payer: MEDICARE

## 2021-01-18 DIAGNOSIS — Z96.612 H/O TOTAL SHOULDER REPLACEMENT, LEFT: Primary | ICD-10-CM

## 2021-01-18 DIAGNOSIS — Z96.612 AFTERCARE FOLLOWING LEFT SHOULDER JOINT REPLACEMENT SURGERY: ICD-10-CM

## 2021-01-18 DIAGNOSIS — Z47.1 AFTERCARE FOLLOWING LEFT SHOULDER JOINT REPLACEMENT SURGERY: ICD-10-CM

## 2021-01-18 DIAGNOSIS — Z96.612 STATUS POST TOTAL SHOULDER REPLACEMENT, LEFT: ICD-10-CM

## 2021-01-18 PROCEDURE — 97140 MANUAL THERAPY 1/> REGIONS: CPT | Performed by: PHYSICAL THERAPIST

## 2021-01-18 PROCEDURE — 97110 THERAPEUTIC EXERCISES: CPT | Performed by: PHYSICAL THERAPIST

## 2021-01-18 NOTE — PROGRESS NOTES
Daily Note     Today's date: 2021  Patient name: Iman Carmichael  : 1945  MRN: [id number]  Referring provider: Jordan Cuevas MD  Dx:   Encounter Diagnosis     ICD-10-CM    1  H/O total shoulder replacement, left  [id number]    2  Status post total shoulder replacement, left  Z96 612    3  Aftercare following left shoulder joint replacement surgery  Z47 1     Z96 [id number]                   Subjective: Patient denies left shoulder pain, but she noted weakness and fatigue limits overhead activities, reaching both straight out and to the side during iadls like punching in numbers on the mac machine, donning and doffing her necklace as well as tuck in the back of her shirt into her pants as well as place her seat belt on  Objective: See treatment diary below  Assessment: Patient presents with left UE fatigue after resistive training noted below  Patient presents with right [address] joint IR limitations and reaching into abduction most limiting during functional activities  Thus, PT is warranted to promote left GH joint strengthening and mobility into both elevation and rotation motion to facilitate long term functional progress  Plan: Continue per plan of care  Progress treatment as tolerated              Precautions: s/p LEFT TSA on 2020:   Weeks 8 initiate Strength as per protocol NO MORE THAN 5 LBS on left le!      Patient's PMHx is remarkable for HTN with 40 mg of Lisinopril and taking metformin 500 mg 2 x per day, and restless leg syndrome and is taking Requip 2 x per day and bilateral TKA                Manuals  21                      Prom left GH joint with specif restrictions noted above  10 min  10 min 10 min  10 min                       There ex              shoulder IR stretch against wall:L: 20 sec x 5 10 sec x 5 10 sec x 5 against wall and with strap 20 sec x 5 10 sec x 5 against wall and with strap 20 sec x 5         IR St S/L L   NT NT NT Sleeper's st on L    10sec 5x NT                      Pulleys  8 min  8 min  8 min  8 min         Wall slides  20x flexion and scaption with 1# 20x flexion and scaption with 1# 20 x flexion and scaption with 1 5# 2 x 10 flexion and scaption with 1 5#                                      tband rows and extension:B: BTB x 20 BTB 20X  BTB 20X  BTB x 20         tband IR and ER:L BTB x 20  BTB 20X  BTB   20X BTB x 20         Standing flex/ scap  20x with 2# 20X 2#  2# x 20 2# x 20         Supine cane flex/ chest press  30x 5# 20X 5#  20X 5#  5# x 20         Supine scap punches  30x 4# 20X 3#  4# 20x 4# x 20         Supine triceps 30x 4# 20X 3#  4# 20x 4# x 20         Biceps  5# 5# x 20 20x 5#  5# x 20         S/L ER    20X 3#  20X 3#  20x 4#  3# x 20         S/L abd   20 x 3# 20X 3# 20x 4#  3# x 20                      Prone abd with palm down L  20X 2#  20X 2# 20x 2#  2# x 20         Prone rows 20x 2# 20X 2#  20x 2#  2# x 20         Prone ext  20X 2#  20X 2#  20x 2#  2# x 20         Prone scap  20X 2#  20X 2#  20x 2#  2# x 20         Standing Flex, Scaption, Abd w/towel roll                                                    Modalities                            CP to left [address]   Joint with Pt seated   D/C

## 2021-01-19 ENCOUNTER — IMMUNIZATIONS (OUTPATIENT)
Dept: FAMILY MEDICINE CLINIC | Facility: HOSPITAL | Age: 76
End: 2021-01-19

## 2021-01-19 DIAGNOSIS — Z23 ENCOUNTER FOR IMMUNIZATION: Primary | ICD-10-CM

## 2021-01-19 PROCEDURE — 91300 SARS-COV-2 / COVID-19 MRNA VACCINE (PFIZER-BIONTECH) 30 MCG: CPT

## 2021-01-19 PROCEDURE — 0001A SARS-COV-2 / COVID-19 MRNA VACCINE (PFIZER-BIONTECH) 30 MCG: CPT

## 2021-01-20 ENCOUNTER — OFFICE VISIT (OUTPATIENT)
Dept: PHYSICAL THERAPY | Age: 76
End: 2021-01-20
Payer: MEDICARE

## 2021-01-20 DIAGNOSIS — Z96.612 AFTERCARE FOLLOWING LEFT SHOULDER JOINT REPLACEMENT SURGERY: ICD-10-CM

## 2021-01-20 DIAGNOSIS — Z96.612 STATUS POST TOTAL SHOULDER REPLACEMENT, LEFT: ICD-10-CM

## 2021-01-20 DIAGNOSIS — Z47.1 AFTERCARE FOLLOWING LEFT SHOULDER JOINT REPLACEMENT SURGERY: ICD-10-CM

## 2021-01-20 DIAGNOSIS — Z96.612 H/O TOTAL SHOULDER REPLACEMENT, LEFT: Primary | ICD-10-CM

## 2021-01-20 PROCEDURE — 97110 THERAPEUTIC EXERCISES: CPT

## 2021-01-20 PROCEDURE — 97140 MANUAL THERAPY 1/> REGIONS: CPT

## 2021-01-20 NOTE — PROGRESS NOTES
Daily Note     Today's date: 2021  Patient name: Selina Pressley  : 1945  MRN: [id number]  Referring provider: Katty Pierce MD  Dx:   Encounter Diagnosis     ICD-10-CM    1  H/O total shoulder replacement, left  Y28 [id number]    2  Status post total shoulder replacement, left  Z96 612    3  Aftercare following left shoulder joint replacement surgery  Z47 1     Z96 612        Start Time: 1000  Stop Time: 1100  Total time in clinic (min): 60 minutes    Subjective: No new complaints       Objective: See treatment diary below  Assessment: Improving with overall left shoulder ROM       Plan: Continue per plan of care  Progress treatment as tolerated              Precautions: s/p LEFT TSA on 2020:   Weeks 8 initiate Strength as per protocol NO MORE THAN 5 LBS on left le!      Patient's PMHx is remarkable for HTN with 40 mg of Lisinopril and taking metformin 500 mg 2 x per day, and restless leg syndrome and is taking Requip 2 x per day and bilateral TKA                Manuals  21                     Prom left GH joint with specif restrictions noted above  10 min  10 min 10 min  10 min 10 min                       There ex              shoulder IR stretch against wall:L: 20 sec x 5 10 sec x 5 10 sec x 5 against wall and with strap 20 sec x 5 10 sec x 5 against wall and with strap 20 sec x 5 20sec 5x        IR St S/L L   NT NT NT 20sec 5x        Sleeper's st on L    10sec 5x NT                      Pulleys  8 min  8 min  8 min  8 min 7 min         Wall slides  20x flexion and scaption with 1# 20x flexion and scaption with 1# 20 x flexion and scaption with 1 5# 2 x 10 flexion and scaption with 1 5# 20X 2#                                      tband rows and extension:B: BTB x 20 BTB 20X  BTB 20X  BTB x 20 BTB 20X         tband IR and ER:L BTB x 20  BTB 20X  BTB   20X BTB x 20         Standing flex/ scap  20x with 2# 20X 2#  2# x 20 2# x 20 20X 2#         Supine cane flex/ chest press  30x 5# 20X 5#  20X 5#  5# x 20 20X 5#        Supine scap punches  30x 4# 20X 3#  4# 20x 4# x 20 20x 4#         Supine triceps 30x 4# 20X 3#  4# 20x 4# x 20 20x 4#         Biceps  5# 5# x 20 20x 5#  5# x 20 20x 4#         S/L ER    20X 3#  20X 3#  20x 4#  3# x 20 20x 2#         S/L abd   20 x 3# 20X 3# 20x 4#  3# x 20 20x 2#                      Prone abd with palm down L  20X 2#  20X 2# 20x 2#  2# x 20 20x 2#        Prone rows 20x 2# 20X 2#  20x 2#  2# x 20 20x 2#         Prone ext  20X 2#  20X 2#  20x 2#  2# x 20 20x 2#        Prone scap  20X 2#  20X 2#  20x 2#  2# x 20 20x 2#         Standing Flex, Scaption, Abd w/towel roll                                                    Modalities                            CP to left [facility]   Joint with Pt seated   D/C

## 2021-01-25 ENCOUNTER — OFFICE VISIT (OUTPATIENT)
Dept: PHYSICAL THERAPY | Age: 76
End: 2021-01-25
Payer: MEDICARE

## 2021-01-25 DIAGNOSIS — Z96.612 H/O TOTAL SHOULDER REPLACEMENT, LEFT: Primary | ICD-10-CM

## 2021-01-25 DIAGNOSIS — Z96.612 AFTERCARE FOLLOWING LEFT SHOULDER JOINT REPLACEMENT SURGERY: ICD-10-CM

## 2021-01-25 DIAGNOSIS — Z96.612 STATUS POST TOTAL SHOULDER REPLACEMENT, LEFT: ICD-10-CM

## 2021-01-25 DIAGNOSIS — Z47.1 AFTERCARE FOLLOWING LEFT SHOULDER JOINT REPLACEMENT SURGERY: ICD-10-CM

## 2021-01-25 PROCEDURE — 97140 MANUAL THERAPY 1/> REGIONS: CPT | Performed by: PHYSICAL THERAPIST

## 2021-01-25 PROCEDURE — 97110 THERAPEUTIC EXERCISES: CPT | Performed by: PHYSICAL THERAPIST

## 2021-01-25 NOTE — PROGRESS NOTES
Daily Note     Today's date: 2021  Patient name: Varinder Perry  : 1945  MRN: [id number]  Referring provider: Madisyn Rich MD  Dx:   Encounter Diagnosis     ICD-10-CM    1  H/O total shoulder replacement, left  W37 836    2  Status post total shoulder replacement, left  Z96 612    3  Aftercare following left shoulder joint replacement surgery  Z47 1     A85 084                   Subjective: Patient reported left shoulder functional activities are limited by fatigue and tightness  Objective: See treatment diary below  Assessment: Patient presents with left [facility] joint elevation arom and strength improved > rotation arom and strength with rotation weakness and fatigue limiting elevation due to decrease in stabilization of left GH joint with elevation  Plan: Continue per plan of care  Progress treatment as tolerated              Precautions: s/p LEFT TSA on 2020:   Weeks 8 initiate Strength as per protocol NO MORE THAN 5 LBS on left le!      Patient's PMHx is remarkable for HTN with 40 mg of Lisinopril and taking metformin 500 mg 2 x per day, and restless leg syndrome and is taking Requip 2 x per day and bilateral TKA                Manuals  21                    Prom left GH joint with specif restrictions noted above  10 min  10 min 10 min  10 min 10 min  10 min                     There ex              shoulder IR stretch against wall:L: 20 sec x 5 10 sec x 5 10 sec x 5 against wall and with strap 20 sec x 5 10 sec x 5 against wall and with strap 20 sec x 5 20sec 5x 20 sec x 5       IR St S/L L   NT NT NT 20sec 5x 20 sec x 5       Sleeper's st on L    10sec 5x NT         Corner pec stretch with bilateral ue at 45 degrees      10 sec x 5       Pulleys  8 min  8 min  8 min  8 min 7 min  8 min       Wall slides  20x flexion and scaption with 1# 20x flexion and scaption with 1# 20 x flexion and scaption with 1 5# 2 x 10 flexion and scaption with 1 5# 20X 2#  2# x 20              2# x 20                      tband rows and extension:B: BTB x 20 BTB 20X  BTB 20X  BTB x 20 BTB 20X  BTB x 30       tband IR and ER:L BTB x 20  BTB 20X  BTB   20X BTB x 20  BTB x 30       Standing flex/ scap  20x with 2# 20X 2#  2# x 20 2# x 20 20X 2#  2# x 20       Supine cane flex/ chest press  30x 5# 20X 5#  20X 5#  5# x 20 20X 5# 5# x 20       Supine scap punches  30x 4# 20X 3#  4# 20x 4# x 20 20x 4#  4# x 20       Supine triceps 30x 4# 20X 3#  4# 20x 4# x 20 20x 4#  4# x 20       Biceps  5# 5# x 20 20x 5#  5# x 20 20x 4#  5# x 20       S/L ER    20X 3#  20X 3#  20x 4#  3# x 20 20x 2#  2# x 20       S/L abd   20 x 3# 20X 3# 20x 4#  3# x 20 20x 2#  2# x 20                    Prone abd with palm down L  20X 2#  20X 2# 20x 2#  2# x 20 20x 2# 2# x 20       Prone rows 20x 2# 20X 2#  20x 2#  2# x 20 20x 2#  2# x 20       Prone ext  20X 2#  20X 2#  20x 2#  2# x 20 20x 2# 2# x20       Prone scap  20X 2#  20X 2#  20x 2#  2# x 20 20x 2#  2# x 20       Standing Flex, Scaption, Abd w/towel roll                                                    Modalities                            CP to left [facility]   Joint with Pt seated   D/C Imaging Studies

## 2021-01-27 ENCOUNTER — OFFICE VISIT (OUTPATIENT)
Dept: PHYSICAL THERAPY | Age: 76
End: 2021-01-27
Payer: MEDICARE

## 2021-01-27 DIAGNOSIS — Z47.1 AFTERCARE FOLLOWING LEFT SHOULDER JOINT REPLACEMENT SURGERY: ICD-10-CM

## 2021-01-27 DIAGNOSIS — Z96.612 AFTERCARE FOLLOWING LEFT SHOULDER JOINT REPLACEMENT SURGERY: ICD-10-CM

## 2021-01-27 DIAGNOSIS — Z96.612 STATUS POST TOTAL SHOULDER REPLACEMENT, LEFT: ICD-10-CM

## 2021-01-27 DIAGNOSIS — Z96.612 H/O TOTAL SHOULDER REPLACEMENT, LEFT: Primary | ICD-10-CM

## 2021-01-27 PROCEDURE — 97110 THERAPEUTIC EXERCISES: CPT

## 2021-01-27 PROCEDURE — 97140 MANUAL THERAPY 1/> REGIONS: CPT

## 2021-01-27 NOTE — PROGRESS NOTES
Daily Note     Today's date: 2021  Patient name: Roro García  : 1945  MRN: [id number]  Referring provider: Eufemia Juárez MD  Dx:   Encounter Diagnosis     ICD-10-CM    1  H/O total shoulder replacement, left  P10 895    2  Status post total shoulder replacement, left  Z96 612    3  Aftercare following left shoulder joint replacement surgery  Z47 1     E31 525                   Subjective: Patient reported left shoulder functional activities are limited by fatigue and tightness  Objective: See treatment diary below  Assessment: Patient presents with weakness at L shoulder in prone  Progress as able       Plan: Continue per plan of care  Progress treatment as tolerated              Precautions: s/p LEFT TSA on 2020:   Weeks 8 initiate Strength as per protocol NO MORE THAN 5 LBS on left le!      Patient's PMHx is remarkable for HTN with 40 mg of Lisinopril and taking metformin 500 mg 2 x per day, and restless leg syndrome and is taking Requip 2 x per day and bilateral TKA                Manuals  21                   Prom left GH joint with specif restrictions noted above  10 min  10 min 10 min  10 min 10 min  10 min 10 min       There ex             IR St S/L L   NT NT NT 20sec 5x 20 sec x 5 20sec 5x       Corner pec stretch with bilateral ue at 45 degrees      10 sec x 5 NT      Pulleys  8 min  8 min  8 min  8 min 7 min  8 min 7 MIN       Wall slides flex/scap  20x flexion and scaption with 1# 20x flexion and scaption with 1# 20 x flexion and scaption with 1 5# 2 x 10 flexion and scaption with 1 5# 20X 2#  2# x 20 20X        tband rows and extension:B: BTB x 20 BTB 20X  BTB 20X  BTB x 20 BTB 20X  BTB x 30 D/C      tband IR and ER:L BTB x 20  BTB 20X  BTB   20X BTB x 20  BTB x 30 D/C      Standing flex/ scap  20x with 2# 20X 2#  2# x 20 2# x 20 20X 2#  2# x 20 20X 3#       Supine cane flex/ chest press  30x 5# 20X 5#  20X 5#  5# x 20 20X 5# 5# x 21 20X 5#      Supine scap punches  30x 4# 20X 3#  4# 20x 4# x 20 20x 4#  4# x 20 4# 20X       Supine triceps 30x 4# 20X 3#  4# 20x 4# x 20 20x 4#  4# x 20 D/C      Biceps  5# 5# x 20 20x 5#  5# x 20 20x 4#  5# x 20 D/C      S/L ER    20X 3#  20X 3#  20x 4#  3# x 20 20x 2#  2# x 20       S/L abd   20 x 3# 20X 3# 20x 4#  3# x 20 20x 2#  2# x 20 20x2#                    Prone abd with palm down L  20X 2#  20X 2# 20x 2#  2# x 20 20x 2# 2# x 20 20x 2#       Prone rows 20x 2# 20X 2#  20x 2#  2# x 20 20x 2#  2# x 20 20x 4#       Prone ext  20X 2#  20X 2#  20x 2#  2# x 20 20x 2# 2# x20 20x 3#       Prone scap  20X 2#  20X 2#  20x 2#  2# x 20 20x 2#  2# x 20 20x 2#        [place] Biceps        8# 20x                 Triceps        8# 20x                  Rows        8# 20x                  Ext         8# 20x                  IR/ER        4# 20x                                   CP to left [address]   Joint with Pt seated   D/C

## 2021-02-02 ENCOUNTER — APPOINTMENT (OUTPATIENT)
Dept: PHYSICAL THERAPY | Age: 76
End: 2021-02-02
Payer: MEDICARE

## 2021-02-04 ENCOUNTER — OFFICE VISIT (OUTPATIENT)
Dept: PHYSICAL THERAPY | Age: 76
End: 2021-02-04
Payer: MEDICARE

## 2021-02-04 DIAGNOSIS — Z47.1 AFTERCARE FOLLOWING LEFT SHOULDER JOINT REPLACEMENT SURGERY: ICD-10-CM

## 2021-02-04 DIAGNOSIS — Z96.612 AFTERCARE FOLLOWING LEFT SHOULDER JOINT REPLACEMENT SURGERY: ICD-10-CM

## 2021-02-04 DIAGNOSIS — Z96.612 STATUS POST TOTAL SHOULDER REPLACEMENT, LEFT: ICD-10-CM

## 2021-02-04 DIAGNOSIS — Z96.612 H/O TOTAL SHOULDER REPLACEMENT, LEFT: Primary | ICD-10-CM

## 2021-02-04 PROCEDURE — 97110 THERAPEUTIC EXERCISES: CPT

## 2021-02-04 PROCEDURE — 97140 MANUAL THERAPY 1/> REGIONS: CPT

## 2021-02-04 NOTE — PROGRESS NOTES
Daily Note     Today's date: 2021  Patient name: Carlos Louis  : 1945  MRN: [id number]  Referring provider: Nickie Levin MD  Dx:   Encounter Diagnosis     ICD-10-CM    1  H/O total shoulder replacement, left  H09 305    2  Status post total shoulder replacement, left  Z96 612    3  Aftercare following left shoulder joint replacement surgery  Z47 1     Z96 612                   Subjective: Patient noted no pain today " I just have trouble reaching behind that"       Objective: See treatment diary below  Assessment: Good tolerance to exercises  Plan: Continue per plan of care  Progress treatment as tolerated              Precautions: s/p LEFT TSA on 2020:   Weeks 8 initiate Strength as per protocol NO MORE THAN 5 LBS on left le!      Patient's PMHx is remarkable for HTN with 40 mg of Lisinopril and taking metformin 500 mg 2 x per day, and restless leg syndrome and is taking Requip 2 x per day and bilateral TKA                Manuals  21/4                  Prom left GH joint with specif restrictions noted above  10 min  10 min 10 min  10 min 10 min  10 min 10 min  10 min      There ex             IR St S/L L   NT NT NT 20sec 5x 20 sec x 5 20sec 5x  20sec 5x      Corner pec stretch with bilateral ue at 45 degrees      10 sec x 5 NT NT      Pulleys  8 min  8 min  8 min  8 min 7 min  8 min 7 MIN  7 min      Wall slides flex/scap  20x flexion and scaption with 1# 20x flexion and scaption with 1# 20 x flexion and scaption with 1 5# 2 x 10 flexion and scaption with 1 5# 20X 2#  2# x 20 20X  20x      tband rows and extension:B: BTB x 20 BTB 20X  BTB 20X  BTB x 20 BTB 20X  BTB x 30 D/C      tband IR and ER:L BTB x 20  BTB 20X  BTB   20X BTB x 20  BTB x 30 D/C      Standing flex/ scap  20x with 2# 20X 2#  2# x 20 2# x 20 20X 2#  2# x 20 20X 3#  20x 3#      Supine cane flex/ chest press  30x 5# 20X 5#  20X 5#  5# x 20 20X 5# 5# x 20 20X 5# 20x 5# Supine scap punches  30x 4# 20X 3#  4# 20x 4# x 20 20x 4#  4# x 20 4# 20X  20x 4#      Supine triceps 30x 4# 20X 3#  4# 20x 4# x 20 20x 4#  4# x 20 D/C      Biceps  5# 5# x 20 20x 5#  5# x 20 20x 4#  5# x 20 D/C      S/L ER    20X 3#  20X 3#  20x 4#  3# x 20 20x 2#  2# x 20 D/C      S/L abd   20 x 3# 20X 3# 20x 4#  3# x 20 20x 2#  2# x 20 20x2#  20x 3#              20x 3#      Prone abd with palm down L  20X 2#  20X 2# 20x 2#  2# x 20 20x 2# 2# x 20 20x 2#  20x 3#      Prone rows 20x 2# 20X 2#  20x 2#  2# x 20 20x 2#  2# x 20 20x 4#  NT     Prone ext  20X 2#  20X 2#  20x 2#  2# x 20 20x 2# 2# x20 20x 3#  NT     Prone scap  20X 2#  20X 2#  20x 2#  2# x 20 20x 2#  2# x 20 20x 2#  NT      [place] Biceps        8# 20x 8# 20X                Triceps        8# 20x  8# 20X                Rows        8# 20x  8# 20X                Ext         8# 20x  8# 20X                IR/ER        4# 20x  4# 20X                                  CP to left [facility]   Joint with Pt seated   D/C

## 2021-02-07 ENCOUNTER — IMMUNIZATIONS (OUTPATIENT)
Dept: FAMILY MEDICINE CLINIC | Facility: HOSPITAL | Age: 76
End: 2021-02-07

## 2021-02-07 DIAGNOSIS — Z23 ENCOUNTER FOR IMMUNIZATION: Primary | ICD-10-CM

## 2021-02-07 PROCEDURE — 0002A SARS-COV-2 / COVID-19 MRNA VACCINE (PFIZER-BIONTECH) 30 MCG: CPT

## 2021-02-07 PROCEDURE — 91300 SARS-COV-2 / COVID-19 MRNA VACCINE (PFIZER-BIONTECH) 30 MCG: CPT

## 2021-02-08 ENCOUNTER — TRANSCRIBE ORDERS (OUTPATIENT)
Dept: PHYSICAL THERAPY | Age: 76
End: 2021-02-08

## 2021-02-08 ENCOUNTER — OFFICE VISIT (OUTPATIENT)
Dept: PHYSICAL THERAPY | Age: 76
End: 2021-02-08
Payer: MEDICARE

## 2021-02-08 DIAGNOSIS — Z96.612 PRESENCE OF LEFT ARTIFICIAL SHOULDER JOINT: Primary | ICD-10-CM

## 2021-02-08 DIAGNOSIS — Z96.612 H/O TOTAL SHOULDER REPLACEMENT, LEFT: Primary | ICD-10-CM

## 2021-02-08 DIAGNOSIS — Z47.1 AFTERCARE FOLLOWING LEFT SHOULDER JOINT REPLACEMENT SURGERY: ICD-10-CM

## 2021-02-08 DIAGNOSIS — Z96.612 STATUS POST TOTAL SHOULDER REPLACEMENT, LEFT: ICD-10-CM

## 2021-02-08 DIAGNOSIS — Z96.612 AFTERCARE FOLLOWING LEFT SHOULDER JOINT REPLACEMENT SURGERY: ICD-10-CM

## 2021-02-08 PROCEDURE — 97110 THERAPEUTIC EXERCISES: CPT

## 2021-02-08 PROCEDURE — 97140 MANUAL THERAPY 1/> REGIONS: CPT

## 2021-02-08 NOTE — PROGRESS NOTES
Daily Note     Today's date: 2021  Patient name: Asaf Garces  : 1945  MRN: [id number]  Referring provider: Tree Lopez MD  Dx:   Encounter Diagnosis     ICD-10-CM    1  H/O total shoulder replacement, left  U16 613    2  Status post total shoulder replacement, left  Z96 612    3  Aftercare following left shoulder joint replacement surgery  Z47 1     A85 245                   Subjective: Patient noted no pain today        Objective: See treatment diary below  Assessment: Tolerated session well, Restricted with end range flexion and IR       Plan: Continue per plan of care  Progress treatment as tolerated              Precautions: s/p LEFT TSA on 2020:   Weeks 8 initiate Strength as per protocol NO MORE THAN 5 LBS on left le!      Patient's PMHx is remarkable for HTN with 40 mg of Lisinopril and taking metformin 500 mg 2 x per day, and restless leg syndrome and is taking Requip 2 x per day and bilateral TKA                Manuals  21                 Prom left GH joint with specif restrictions noted above  10 min  10 min 10 min  10 min 10 min  10 min 10 min  10 min  10 min     There ex             Pulleys  8 min  8 min  8 min  8 min 7 min  8 min 7 MIN  7 min  7 min     Wall slides flex/scap  20x flexion and scaption with 1# 20x flexion and scaption with 1# 20 x flexion and scaption with 1 5# 2 x 10 flexion and scaption with 1 5# 20X 2#  2# x 20 20X  20x 20x     Standing flex/ scap  20x with 2# 20X 2#  2# x 20 2# x 20 20X 2#  2# x 20 20X 3#  20x 3#  20x 3#     Supine cane flex/ chest press  30x 5# 20X 5#  20X 5#  5# x 20 20X 5# 5# x 20 20X 5# 20x 5#  20x 7 5#     Supine scap punches  30x 4# 20X 3#  4# 20x 4# x 20 20x 4#  4# x 20 4# 20X  20x 4#  20x 4#     S/L ER    20X 3#  20X 3#  20x 4#  3# x 20 20x 2#  2# x 20 D/C      S/L abd   20 x 3# 20X 3# 20x 4#  3# x 20 20x 2#  2# x 20 20x2#  20x 3#  20x 3#     Prone abd with palm down L  20X 2#  20X 2# 20x 2#  2# x 20 20x 2# 2# x 20 20x 2#  20x 3#  20x 3#     Prone rows 20x 2# 20X 2#  20x 2#  2# x 20 20x 2#  2# x 20 20x 4#  NT 20x 3#     Prone ext  20X 2#  20X 2#  20x 2#  2# x 20 20x 2# 2# x20 20x 3#  NT 20x 3#     Prone scap  20X 2#  20X 2#  20x 2#  2# x 20 20x 2#  2# x 20 20x 2#  NT 20x 3#      [place] Biceps        8# 20x 8# 20X 12# 20x               Triceps        8# 20x  8# 20X 12# 20x               Rows        8# 20x  8# 20X 12# 20x                Ext         8# 20x  8# 20X 12# 20x               IR/ER        4# 20x  4# 20X  4# 20x

## 2021-02-11 ENCOUNTER — OFFICE VISIT (OUTPATIENT)
Dept: PHYSICAL THERAPY | Age: 76
End: 2021-02-11
Payer: MEDICARE

## 2021-02-11 DIAGNOSIS — Z96.612 STATUS POST TOTAL SHOULDER REPLACEMENT, LEFT: ICD-10-CM

## 2021-02-11 DIAGNOSIS — Z96.612 AFTERCARE FOLLOWING LEFT SHOULDER JOINT REPLACEMENT SURGERY: ICD-10-CM

## 2021-02-11 DIAGNOSIS — Z47.1 AFTERCARE FOLLOWING LEFT SHOULDER JOINT REPLACEMENT SURGERY: ICD-10-CM

## 2021-02-11 DIAGNOSIS — Z96.612 H/O TOTAL SHOULDER REPLACEMENT, LEFT: Primary | ICD-10-CM

## 2021-02-11 PROCEDURE — 97110 THERAPEUTIC EXERCISES: CPT

## 2021-02-11 PROCEDURE — 97140 MANUAL THERAPY 1/> REGIONS: CPT

## 2021-02-11 NOTE — PROGRESS NOTES
Daily Note     Today's date: 2021  Patient name: Alexis Hunter  : 1945  MRN: [id number]  Referring provider: Yolande Jackson MD  Dx:   Encounter Diagnosis     ICD-10-CM    1  H/O total shoulder replacement, left  C08 537    2  Status post total shoulder replacement, left  Z96 612    3  Aftercare following left shoulder joint replacement surgery  Z47 1     W79 313                   Subjective: Patient noted no pain today        Objective: See treatment diary below  Assessment: Tolerated session well, Restricted with end range flexion and IR  Progress as able       Plan: Continue per plan of care  Progress treatment as tolerated              Precautions: s/p LEFT TSA on 2020:   Weeks 8 initiate Strength as per protocol NO MORE THAN 5 LBS on left le!      Patient's PMHx is remarkable for HTN with 40 mg of Lisinopril and taking metformin 500 mg 2 x per day, and restless leg syndrome and is taking Requip 2 x per day and bilateral TKA                Manuals  21                Prom left GH joint with specif restrictions noted above  10 min  10 min 10 min  10 min 10 min  10 min 10 min  10 min  10 min  10 min   There ex             Pulleys  8 min  8 min  8 min  8 min 7 min  8 min 7 MIN  7 min  7 min  7 min    Wall slides flex/scap  20x flexion and scaption with 1# 20x flexion and scaption with 1# 20 x flexion and scaption with 1 5# 2 x 10 flexion and scaption with 1 5# 20X 2#  2# x 20 20X  20x 20x  20x   Standing flex/ scap  20x with 2# 20X 2#  2# x 20 2# x 20 20X 2#  2# x 20 20X 3#  20x 3#  20x 3#  20x 3#    Supine cane flex/ chest press  30x 5# 20X 5#  20X 5#  5# x 20 20X 5# 5# x 20 20X 5# 20x 5#  20x 7 5#  20x 8#    Supine scap punches  30x 4# 20X 3#  4# 20x 4# x 20 20x 4#  4# x 20 4# 20X  20x 4#  20x 4#  20x 4#    S/L ER    20X 3#  20X 3#  20x 4#  3# x 20 20x 2#  2# x 20 D/C      S/L abd   20 x 3# 20X 3# 20x 4#  3# x 20 20x 2#  2# x 20 20x2# 20x 3#  20x 3#  20x 4#    Prone abd with palm down L  20X 2#  20X 2# 20x 2#  2# x 20 20x 2# 2# x 20 20x 2#  20x 3#  20x 3#  20x 3#    Prone rows 20x 2# 20X 2#  20x 2#  2# x 20 20x 2#  2# x 20 20x 4#  NT 20x 3#  20x 3#    Prone ext  20X 2#  20X 2#  20x 2#  2# x 20 20x 2# 2# x20 20x 3#  NT 20x 3#  20x 3#    Prone scap  20X 2#  20X 2#  20x 2#  2# x 20 20x 2#  2# x 20 20x 2#  NT 20x 3#  20x 3#     [place] Biceps        8# 20x 8# 20X 12# 20x  12# 20x               Triceps        8# 20x  8# 20X 12# 20x 12# 20x              Rows        8# 20x  8# 20X 12# 20x  12# 20x               Ext         8# 20x  8# 20X 12# 20x 12# 20x               IR/ER        4# 20x  4# 20X  4# 20x  4# 20x

## 2021-02-15 ENCOUNTER — OFFICE VISIT (OUTPATIENT)
Dept: PHYSICAL THERAPY | Age: 76
End: 2021-02-15
Payer: MEDICARE

## 2021-02-15 DIAGNOSIS — Z96.612 STATUS POST TOTAL SHOULDER REPLACEMENT, LEFT: ICD-10-CM

## 2021-02-15 DIAGNOSIS — Z96.612 H/O TOTAL SHOULDER REPLACEMENT, LEFT: Primary | ICD-10-CM

## 2021-02-15 DIAGNOSIS — Z96.612 AFTERCARE FOLLOWING LEFT SHOULDER JOINT REPLACEMENT SURGERY: ICD-10-CM

## 2021-02-15 DIAGNOSIS — Z47.1 AFTERCARE FOLLOWING LEFT SHOULDER JOINT REPLACEMENT SURGERY: ICD-10-CM

## 2021-02-15 PROCEDURE — 97140 MANUAL THERAPY 1/> REGIONS: CPT | Performed by: PHYSICAL THERAPIST

## 2021-02-15 PROCEDURE — 97110 THERAPEUTIC EXERCISES: CPT | Performed by: PHYSICAL THERAPIST

## 2021-02-15 NOTE — PROGRESS NOTES
Daily Note     Today's date: 2/15/2021  Patient name: Valerie Page  : 1945  MRN: [id number]  Referring provider: Vee Urban MD  Dx:   Encounter Diagnosis     ICD-10-CM    1  H/O total shoulder replacement, left  Y08 076    2  Status post total shoulder replacement, left  Z96 612    3  Aftercare following left shoulder joint replacement surgery  Z47 1     I64 524                   Subjective: Patient reported left shoulder fatigue and weakness limits prolonged and repeated use of left ue  Objective: See treatment diary below  Assessment:Patient exhibits fatigue with left ue MRE activities that mimics left ue functional deficits of reaching, lifting and grabbing activities  Thus, PT is warranted to promote left ue strength and functional progress  Patient agrees with PT POC to continue for 2 more weeks then d/c to hep  Plan: Continue per plan of care  Progress treatment as tolerated              Precautions: s/p LEFT TSA on 2020:   Weeks 8 initiate Strength as per protocol NO MORE THAN 5 LBS on left le!      Patient's PMHx is remarkable for HTN with 40 mg of Lisinopril and taking metformin 500 mg 2 x per day, and restless leg syndrome and is taking Requip 2 x per day and bilateral TKA                Manuals  2/15/21  1/11/21 1/13/21 1/18 1/20 1/25 1/27 2/4 2/8 2/11                Prom left GH joint with specif restrictions noted above  10 min  10 min 10 min  10 min 10 min  10 min 10 min  10 min  10 min  10 min   There ex             Pulleys  7 min  8 min  8 min  8 min 7 min  8 min 7 MIN  7 min  7 min  7 min    Wall slides flex/scap  20x flexion and scaption with 1 5# 20x flexion and scaption with 1# 20 x flexion and scaption with 1 5# 2 x 10 flexion and scaption with 1 5# 20X 2#  2# x 20 20X  20x 20x  20x   Standing flex/ scap  20x with 3# 20X 2#  2# x 20 2# x 20 20X 2#  2# x 20 20X 3#  20x 3#  20x 3#  20x 3#    Supine cane flex/ chest press  30x 5# 20X 5#  20X 5#  5# x 20 20X 5# 5# x 20 20X 5# 20x 5#  20x 7 5#  20x 8#    Supine scap punches  30x 4# 20X 3#  4# 20x 4# x 20 20x 4#  4# x 20 4# 20X  20x 4#  20x 4#  20x 4#    S/L ER    30X 3#  20X 3#  20x 4#  3# x 20 20x 2#  2# x 20 NT NT NT NT   S/L abd   20 x 3# 20X 3# 20x 4#  3# x 20 20x 2#  2# x 20 20x2#  20x 3#  20x 3#  20x 4#    Prone abd with palm down L  20X 2#  20X 2# 20x 2#  2# x 20 20x 2# 2# x 20 20x 2#  20x 3#  20x 3#  20x 3#    Prone rows 20x 2# 20X 2#  20x 2#  2# x 20 20x 2#  2# x 20 20x 4#  NT 20x 3#  20x 3#    Prone ext  20X 2#  20X 2#  20x 2#  2# x 20 20x 2# 2# x20 20x 3#  NT 20x 3#  20x 3#    Prone scap  20X 2#  20X 2#  20x 2#  2# x 20 20x 2#  2# x 20 20x 2#  NT 20x 3#  20x 3#     [place] Biceps  11# x 20      8# 20x 8# 20X 12# 20x  12# 20x               Triceps  13# x 20      8# 20x  8# 20X 12# 20x 12# 20x              Rows  12# x 20      8# 20x  8# 20X 12# 20x  12# 20x               Ext  12# x 20       8# 20x  8# 20X 12# 20x 12# 20x               IR/ER  [id number]# x 20      4# 20x  4# 20X  4# 20x  4# 20x

## 2021-02-17 ENCOUNTER — OFFICE VISIT (OUTPATIENT)
Dept: PHYSICAL THERAPY | Age: 76
End: 2021-02-17
Payer: MEDICARE

## 2021-02-17 DIAGNOSIS — Z47.1 AFTERCARE FOLLOWING LEFT SHOULDER JOINT REPLACEMENT SURGERY: ICD-10-CM

## 2021-02-17 DIAGNOSIS — Z96.612 AFTERCARE FOLLOWING LEFT SHOULDER JOINT REPLACEMENT SURGERY: ICD-10-CM

## 2021-02-17 DIAGNOSIS — Z96.612 H/O TOTAL SHOULDER REPLACEMENT, LEFT: Primary | ICD-10-CM

## 2021-02-17 DIAGNOSIS — Z96.612 STATUS POST TOTAL SHOULDER REPLACEMENT, LEFT: ICD-10-CM

## 2021-02-17 PROCEDURE — 97140 MANUAL THERAPY 1/> REGIONS: CPT | Performed by: PHYSICAL THERAPIST

## 2021-02-17 PROCEDURE — 97110 THERAPEUTIC EXERCISES: CPT | Performed by: PHYSICAL THERAPIST

## 2021-02-17 NOTE — LETTER
2021    Cathy Betts MD  [facility]    Patient: Gemma Galo   YOB: 1945   Date of Visit: 2021     Encounter Diagnosis     ICD-10-CM    1  H/O total shoulder replacement, left  Z96 612    2  Status post total shoulder replacement, left  Z96 612    3  Aftercare following left shoulder joint replacement surgery  Z47 1     [id number]        Dear Dr Lita Osgood: Thank you for your recent referral of Gemma Galo  Please review the attached evaluation summary from [facility] recent visit  Please verify that you agree with the plan of care by signing the attached order  If you have any questions or concerns, please do not hesitate to call  I sincerely appreciate the opportunity to share in the care of one of your patients and hope to have another opportunity to work with you in the near future  Sincerely,    Tucker Rivas, PT      Referring Provider:      I certify that I have read the below Plan of Care and certify the need for these services furnished under this plan of treatment while under my care  Cathy Betts MD  [place] [id number]  Via Fax: [phone]              PT Evaluation / PT Reassessment    Today's date: 2021  Patient name: Gemma Galo  : 1945  MRN: [id number]  Referring provider: Rober Lopez MD  Dx:   Encounter Diagnosis     ICD-10-CM    1  H/O total shoulder replacement, left  Z25 704    2  Status post total shoulder replacement, left  Z96 612    3  Aftercare following left shoulder joint replacement surgery  Z47 1     Z96 [id number]                   Assessment  Assessment details: PT Reassessment: 2021  Patient reported the following progress since onset of PT:  Left shoulder pain reduction, left shoulder mobility, reaching up onto a shelf without pain or weakness, able to wash back with the wash cloth and drive    But, she noted the following deficits that still persists: heavy lifting and fatigue with repeated activities  PT Reassessment: 1-6-2021  Patient reported the following progress since onset of PT: left ue mobility, left ue strength, able to close the car door with left ue, reach higher to grab items, dressing improved and pain reduced  But, she noted the following deficits that still persist: donning and doffing the seat belt, reaching into full elevation limited, lifting with left ue, difficulty with the ability to wash and dry her back with her left ue  PT Reassessment:  12/10/2020  Patient reported the following progress since onset of PT: able to use left shoulder to close the car door, increase in left ue mobility and strength, opening the refrigerator door, pulling her pants up, getting tops on, sleep improved, decrease in pain  Patient noted the following deficits since onset of PT: use her left arm on the top of her head to use curlers and do her hair, reaching over head height, lifting items over head, lifting moderate activities, donning and doffing the seat belt with her left arom while she is driving and left ue weakness and fatigue with iadls and self functional activities  Plus, she noted she gets an intermittent left anterior shoulder " twinge of pain"  PT Reassessment: 11/10/2020  Patient noted the following progress since onset of PT: decrease in left shoulder pain, increase in left upper extremity mobility and strength, able to use left upper extremity to pull her seat belt down and on, open refrigerator door, pulling up pants in the back, pull up the covers while in bed and showering without deficits or difficulties    But, she noted the deficits still persist: reaching above shoulder height, lifting, lifting up groceries and a foot items like a gallon of milk, helping her  while is medically compromised, inability to lie in left side lying with arm elevated which limits her sleep, assisting her spouse in his iadls and showering, full reaching back and down to adjust her torso and le clothing dressing activities, left ue weakness,stiffness and mobility deficits and functional deficits  PT Reassessment: 10/08/2020  Patient reported the following progress since onset of left TSA and PT: left shoulder pain, able to resume sitting in her recliner without pain, improvement with self iadls and grooming, showering, and dressing improved  Patient noted the following deficits still persist: doing her hair / grooming, taking care of her her elder , reaching, lifting, pulling up her pants in the back  Patient noted doing her hair and pulling up her pants produced left shoulder pain at 3 of 10  PT IE: 08/28/2020  Patient had left TSA on 08/26/2020  Patient has the following movement limitations as per surgeon: max ER at 30 degrees, NO active IR to protect the subscapularis, wear sling for 4 weeks ( until 9/26/2020) aarom flexion at 110 and active elbow wrist and hand movements to pt tolerance  Patient noted she as nauseated yesterday which she feels is due to anesthesia  Patient noted she had a standard TSA on 8/26/2020  Patient noted prior to surgery left GH joint was painful with elevation based movements which worsened and then due to inactivity due to COVID her left shoulder pain worsened  Patient noted prior to surgical intervention she will have intermittent left clavicle to neck to jaw pain which was present when overall activity during the day increased  Patient noted she is right ue dominant  Patient noted she still has left 1 and 2 finger tingling sensation due to left ue nerve block which she noted yesterday all 5 fingers were tingling  Patient denies left hand edema  Patient noted she was able to sleep in a recliner due to sleep in a bed not occurring due to extreme difficulties getting in and out of bed  Patient noted she returns [facility] in one month    Patient noted the day after surgery she was instructed to not move her left shoulder  Patient noted she requires assistance of getting dressed as well as sling donning and doffing  Impairments: abnormal or restricted ROM, abnormal movement, activity intolerance, lacks appropriate home exercise program and pain with function  Understanding of Dx/Px/POC: excellent   Prognosis: good  Prognosis details: Patient is a 76y o  year old female seen for outpatient PT reevaluation with pain, mobility and functional deficits due to s/p left TSA  Patient presents with the following progress since onset of PT: decrease in left shoulder pain, increase in left ue rom and strength, dressing and driving improvements, reaching and lifting improvements, able to do more for her handicapped , self iadls and functional progress  But, she presents to PT on reassessment with the following problems, concerns, deficits and impairments that still persist: intermittent left shoulder region pain, decreased left ue range of motion, decreased left ue strength, reaching across her body to put on seat belt, pulling up her pants, functional limitations with deficits with dressing, self iadls, sleep deprived and decreased tolerance to activity and challenges with pushing  around in his wheel chair  Patient would benefit from skilled PT services under the following PT treatment plan to address the above noted deficits: therapeutic exercises and activities to facilitate left ue rom and strength as per surgeon specific protocols, postural reeducation and strengthening, modalities, manual therapy techniques, IASTM techniques, Kinesio tapings and a hep  Thank you for the referral      Goals  Short Term goals 4 - 6 weeks  1  Patient will be independent HEP  MET  2   Patient will report a 25 - 50% decrease in pain complaints  MET  3   Increase strength 1/2 grade  MET  4   Increase ROM 5-10 degrees  MET  Long Term goals 10 - 12 weeks  1    Patient will report elimination of pain complaints  Partially MET  2   Patient will return to all recreational activities without restriction  Partially MET  3   ROM WFL  Partially MET  4   Strength 5/5  Partially MET  5   Patient will exhibit the ability to sleep in bed  Partially MET  6   Patient will exhibit the ability to dress herself without assistance  MET  7   Patient will exhibit the ability to shower independently  MET  8   Patient will be able to assist  with his activities as she did pre-surgery  Partially MET  9   Patient will be able to drive independently  MET  10   Patient will be able to resume all house hold functional activities  MET  Plan  Patient would benefit from: skilled physical therapy  Planned modality interventions: cryotherapy, TENS, thermotherapy: hydrocollator packs and unattended electrical stimulation  Planned therapy interventions: joint mobilization, manual therapy, massage, neuromuscular re-education, body mechanics training, patient education, postural training, self care, compression, strengthening, stretching, therapeutic activities, therapeutic exercise, therapeutic training, flexibility, functional ROM exercises, graded activity, graded exercise, graded motor and home exercise program  Frequency: 2x week  Duration in weeks: 12  Treatment plan discussed with: patient        Subjective Evaluation    History of Present Illness  Mechanism of injury: Patient's PMHx is remarkable for HTN with 40 mg of Lisinopril and taking metformin 500 mg 2 x per day, and restless leg syndrome and is taking Requip 2 x per day and bilateral TKA  Pain  At best pain ratin  At worst pain ratin  Location: left shoulder    Patient Goals  Patient goals for therapy: decreased pain, increased motion, increased strength and independence with ADLs/IADLs          Objective     Tenderness     Additional Tenderness Details  Patient is - TTP at left [facility] joint region      Active Range of Motion   Left Shoulder   Flexion: 152 degrees   Abduction: 150 degrees   External rotation 90°: 68 degrees   Internal rotation 90°: 48 degrees     Right Shoulder   Flexion: 164 degrees   Abduction: 162 degrees   External rotation 90°: 80 degrees  Internal rotation 90°: 55 degrees     Left Elbow   Flexion: 144 degrees   Extension: -5 degrees     Right Elbow   Flexion: 142 degrees   Extension: -5 degrees     Passive Range of Motion   Left Shoulder   Flexion: 165 degrees   Abduction: 155 degrees   External rotation 45°: 82 degrees   Internal rotation 90°: 64 degrees     Strength/Myotome Testing     Left Shoulder     Planes of Motion   Flexion: 4+   Abduction: 4   External rotation at 0°: 4   Internal rotation at 0°: 4+     Right Shoulder     Planes of Motion   Flexion: 5   Abduction: 5   External rotation at 0°: 4+   Internal rotation at 0°: 5     Left Elbow   Flexion: 5  Extension: 4+    Right Elbow   Flexion: 5  Extension: 5               Precautions: s/p LEFT TSA on 08/26/2020:   Weeks 8 initiate Strength as per protocol NO MORE THAN 5 LBS on left le!      Patient's PMHx is remarkable for HTN with 40 mg of Lisinopril and taking metformin 500 mg 2 x per day, and restless leg syndrome and is taking Requip 2 x per day and bilateral TKA                Manuals  2/15/21  2/17/21 1/13/21 1/18 1/20 1/25 1/27 2/4 2/8 2/11                Prom left GH joint with specif restrictions noted above  10 min  10 min 10 min  10 min 10 min  10 min 10 min  10 min  10 min  10 min   There ex             Pulleys  7 min  7 min  8 min  8 min 7 min  8 min 7 MIN  7 min  7 min  7 min    Wall slides flex/scap  20x flexion and scaption with 1 5# 20x flexion and scaption with 1 5# 20 x flexion and scaption with 1 5# 2 x 10 flexion and scaption with 1 5# 20X 2#  2# x 20 20X  20x 20x  20x   Standing flex/ scap  20x with 3# 20X 2#  2# x 20 2# x 20 20X 2#  2# x 20 20X 3#  20x 3#  20x 3#  20x 3#    Supine cane flex/ chest press  30x 5# 30X 5#  20X 5#  5# x 20 20X 5# 5# x 20 20X 5# 20x 5#  20x 7 5#  20x 8#    Supine scap punches  30x 4# 30X 4#  4# 20x 4# x 20 20x 4#  4# x 20 4# 20X  20x 4#  20x 4#  20x 4#    S/L ER    30X 3#  20X 3#  20x 4#  3# x 20 20x 2#  2# x 20 NT NT NT NT   S/L abd   20 x 3# 20X 3# 20x 4#  3# x 20 20x 2#  2# x 20 20x2#  20x 3#  20x 3#  20x 4#    Prone abd with palm down L  20X 2#  20X 2# 20x 2#  2# x 20 20x 2# 2# x 20 20x 2#  20x 3#  20x 3#  20x 3#    Prone rows 20x 2# 20X 2#  20x 2#  2# x 20 20x 2#  2# x 20 20x 4#  NT 20x 3#  20x 3#    Prone ext  20X 2#  20X 2#  20x 2#  2# x 20 20x 2# 2# x20 20x 3#  NT 20x 3#  20x 3#    Prone scap  20X 2#  20X 2#  20x 2#  2# x 20 20x 2#  2# x 20 20x 2#  NT 20x 3#  20x 3#     [place] Biceps  11# x 20 13# x 20     8# 20x 8# 20X 12# 20x  12# 20x               Triceps  13# x 20 13# x 20     8# 20x  8# 20X 12# 20x 12# 20x              Rows  12# x 20 12# x 20     8# 20x  8# 20X 12# 20x  12# 20x               Ext  12# x 20 12# x 20      8# 20x  8# 20X 12# 20x 12# 20x               IR/ER  [id number]# x 20 IR 5# x 20 and ER 3# x 20     4# 20x  4# 20X  4# 20x  4# 20x

## 2021-02-17 NOTE — PROGRESS NOTES
PT Evaluation / PT Reassessment    Today's date: 2021  Patient name: Maria Batista  : 1945  MRN: [id number]  Referring provider: Palmira Forbes MD  Dx:   Encounter Diagnosis     ICD-10-CM    1  H/O total shoulder replacement, left  W68 193    2  Status post total shoulder replacement, left  Z96 612    3  Aftercare following left shoulder joint replacement surgery  Z47 1     Z96 612                   Assessment  Assessment details: PT Reassessment: 2021  Patient reported the following progress since onset of PT:  Left shoulder pain reduction, left shoulder mobility, reaching up onto a shelf without pain or weakness, able to wash back with the wash cloth and drive  But, she noted the following deficits that still persists: heavy lifting and fatigue with repeated activities  PT Reassessment: 2021  Patient reported the following progress since onset of PT: left ue mobility, left ue strength, able to close the car door with left ue, reach higher to grab items, dressing improved and pain reduced  But, she noted the following deficits that still persist: donning and doffing the seat belt, reaching into full elevation limited, lifting with left ue, difficulty with the ability to wash and dry her back with her left ue  PT Reassessment:  12/10/2020  Patient reported the following progress since onset of PT: able to use left shoulder to close the car door, increase in left ue mobility and strength, opening the refrigerator door, pulling her pants up, getting tops on, sleep improved, decrease in pain  Patient noted the following deficits since onset of PT: use her left arm on the top of her head to use curlers and do her hair, reaching over head height, lifting items over head, lifting moderate activities, donning and doffing the seat belt with her left arom while she is driving and left ue weakness and fatigue with iadls and self functional activities   Plus, she noted she gets an intermittent left anterior shoulder " twinge of pain"  PT Reassessment: 11/10/2020  Patient noted the following progress since onset of PT: decrease in left shoulder pain, increase in left upper extremity mobility and strength, able to use left upper extremity to pull her seat belt down and on, open refrigerator door, pulling up pants in the back, pull up the covers while in bed and showering without deficits or difficulties  But, she noted the deficits still persist: reaching above shoulder height, lifting, lifting up groceries and a foot items like a gallon of milk, helping her  while is medically compromised, inability to lie in left side lying with arm elevated which limits her sleep, assisting her spouse in his iadls and showering, full reaching back and down to adjust her torso and le clothing dressing activities, left ue weakness,stiffness and mobility deficits and functional deficits  PT Reassessment: 10/08/2020  Patient reported the following progress since onset of left TSA and PT: left shoulder pain, able to resume sitting in her recliner without pain, improvement with self iadls and grooming, showering, and dressing improved  Patient noted the following deficits still persist: doing her hair / grooming, taking care of her her elder , reaching, lifting, pulling up her pants in the back  Patient noted doing her hair and pulling up her pants produced left shoulder pain at 3 of 10  PT IE: 08/28/2020  Patient had left TSA on 08/26/2020  Patient has the following movement limitations as per surgeon: max ER at 30 degrees, NO active IR to protect the subscapularis, wear sling for 4 weeks ( until 9/26/2020) aarom flexion at 110 and active elbow wrist and hand movements to pt tolerance  Patient noted she as nauseated yesterday which she feels is due to anesthesia  Patient noted she had a standard TSA on 8/26/2020   Patient noted prior to surgery left GH joint was painful with elevation based movements which worsened and then due to inactivity due to COVID her left shoulder pain worsened  Patient noted prior to surgical intervention she will have intermittent left clavicle to neck to jaw pain which was present when overall activity during the day increased  Patient noted she is right ue dominant  Patient noted she still has left 1 and 2 finger tingling sensation due to left ue nerve block which she noted yesterday all 5 fingers were tingling  Patient denies left hand edema  Patient noted she was able to sleep in a recliner due to sleep in a bed not occurring due to extreme difficulties getting in and out of bed  Patient noted she returns to Dr Bernabe Whaley in one month  Patient noted the day after surgery she was instructed to not move her left shoulder  Patient noted she requires assistance of getting dressed as well as sling donning and doffing  Impairments: abnormal or restricted ROM, abnormal movement, activity intolerance, lacks appropriate home exercise program and pain with function  Understanding of Dx/Px/POC: excellent   Prognosis: good  Prognosis details: Patient is a 76y o  year old female seen for outpatient PT reevaluation with pain, mobility and functional deficits due to s/p left TSA  Patient presents with the following progress since onset of PT: decrease in left shoulder pain, increase in left ue rom and strength, dressing and driving improvements, reaching and lifting improvements, able to do more for her handicapped , self iadls and functional progress    But, she presents to PT on reassessment with the following problems, concerns, deficits and impairments that still persist: intermittent left shoulder region pain, decreased left ue range of motion, decreased left ue strength, reaching across her body to put on seat belt, pulling up her pants, functional limitations with deficits with dressing, self iadls, sleep deprived and decreased tolerance to activity and challenges with pushing  around in his wheel chair  Patient would benefit from skilled PT services under the following PT treatment plan to address the above noted deficits: therapeutic exercises and activities to facilitate left ue rom and strength as per surgeon specific protocols, postural reeducation and strengthening, modalities, manual therapy techniques, IASTM techniques, Kinesio tapings and a hep  Thank you for the referral      Goals  Short Term goals 4 - 6 weeks  1  Patient will be independent HEP  MET  2   Patient will report a 25 - 50% decrease in pain complaints  MET  3   Increase strength 1/2 grade  MET  4   Increase ROM 5-10 degrees  MET  Long Term goals 10 - 12 weeks  1  Patient will report elimination of pain complaints  Partially MET  2   Patient will return to all recreational activities without restriction  Partially MET  3   ROM WFL  Partially MET  4   Strength 5/5  Partially MET  5   Patient will exhibit the ability to sleep in bed  Partially MET  6   Patient will exhibit the ability to dress herself without assistance  MET  7   Patient will exhibit the ability to shower independently  MET  8   Patient will be able to assist  with his activities as she did pre-surgery  Partially MET  9   Patient will be able to drive independently  MET  10   Patient will be able to resume all house hold functional activities  MET      Plan  Patient would benefit from: skilled physical therapy  Planned modality interventions: cryotherapy, TENS, thermotherapy: hydrocollator packs and unattended electrical stimulation  Planned therapy interventions: joint mobilization, manual therapy, massage, neuromuscular re-education, body mechanics training, patient education, postural training, self care, compression, strengthening, stretching, therapeutic activities, therapeutic exercise, therapeutic training, flexibility, functional ROM exercises, graded activity, graded exercise, graded motor and home exercise program  Frequency: 2x week  Duration in weeks: 12  Treatment plan discussed with: patient        Subjective Evaluation    History of Present Illness  Mechanism of injury: Patient's PMHx is remarkable for HTN with 40 mg of Lisinopril and taking metformin 500 mg 2 x per day, and restless leg syndrome and is taking Requip 2 x per day and bilateral TKA  Pain  At best pain ratin  At worst pain ratin  Location: left shoulder    Patient Goals  Patient goals for therapy: decreased pain, increased motion, increased strength and independence with ADLs/IADLs          Objective     Tenderness     Additional Tenderness Details  Patient is - TTP at left [address] joint region      Active Range of Motion   Left Shoulder   Flexion: 152 degrees   Abduction: 150 degrees   External rotation 90°: 68 degrees   Internal rotation 90°: 48 degrees     Right Shoulder   Flexion: 164 degrees   Abduction: 162 degrees   External rotation 90°: 80 degrees  Internal rotation 90°: 55 degrees     Left Elbow   Flexion: 144 degrees   Extension: -5 degrees     Right Elbow   Flexion: 142 degrees   Extension: -5 degrees     Passive Range of Motion   Left Shoulder   Flexion: 165 degrees   Abduction: 155 degrees   External rotation 45°: 82 degrees   Internal rotation 90°: 64 degrees     Strength/Myotome Testing     Left Shoulder     Planes of Motion   Flexion: 4+   Abduction: 4   External rotation at 0°: 4   Internal rotation at 0°: 4+     Right Shoulder     Planes of Motion   Flexion: 5   Abduction: 5   External rotation at 0°: 4+   Internal rotation at 0°: 5     Left Elbow   Flexion: 5  Extension: 4+    Right Elbow   Flexion: 5  Extension: 5               Precautions: s/p LEFT TSA on 2020:   Weeks 8 initiate Strength as per protocol NO MORE THAN 5 LBS on left le!      Patient's PMHx is remarkable for HTN with 40 mg of Lisinopril and taking metformin 500 mg 2 x per day, and restless leg syndrome and is taking Requip 2 x per day and bilateral TKA                Manuals  2/15/21  2/17/21 1/13/21 1/18 1/20 1/25 1/27 2/4 2/8 2/11                Prom left GH joint with specif restrictions noted above  10 min  10 min 10 min  10 min 10 min  10 min 10 min  10 min  10 min  10 min   There ex             Pulleys  7 min  7 min  8 min  8 min 7 min  8 min 7 MIN  7 min  7 min  7 min    Wall slides flex/scap  20x flexion and scaption with 1 5# 20x flexion and scaption with 1 5# 20 x flexion and scaption with 1 5# 2 x 10 flexion and scaption with 1 5# 20X 2#  2# x 20 20X  20x 20x  20x   Standing flex/ scap  20x with 3# 20X 2#  2# x 20 2# x 20 20X 2#  2# x 20 20X 3#  20x 3#  20x 3#  20x 3#    Supine cane flex/ chest press  30x 5# 30X 5#  20X 5#  5# x 20 20X 5# 5# x 20 20X 5# 20x 5#  20x 7 5#  20x 8#    Supine scap punches  30x 4# 30X 4#  4# 20x 4# x 20 20x 4#  4# x 20 4# 20X  20x 4#  20x 4#  20x 4#    S/L ER    30X 3#  20X 3#  20x 4#  3# x 20 20x 2#  2# x 20 NT NT NT NT   S/L abd   20 x 3# 20X 3# 20x 4#  3# x 20 20x 2#  2# x 20 20x2#  20x 3#  20x 3#  20x 4#    Prone abd with palm down L  20X 2#  20X 2# 20x 2#  2# x 20 20x 2# 2# x 20 20x 2#  20x 3#  20x 3#  20x 3#    Prone rows 20x 2# 20X 2#  20x 2#  2# x 20 20x 2#  2# x 20 20x 4#  NT 20x 3#  20x 3#    Prone ext  20X 2#  20X 2#  20x 2#  2# x 20 20x 2# 2# x20 20x 3#  NT 20x 3#  20x 3#    Prone scap  20X 2#  20X 2#  20x 2#  2# x 20 20x 2#  2# x 20 20x 2#  NT 20x 3#  20x 3#     [place] Biceps  11# x 20 13# x 20     8# 20x 8# 20X 12# 20x  12# 20x               Triceps  13# x 20 13# x 20     8# 20x  8# 20X 12# 20x 12# 20x              Rows  12# x 20 12# x 20     8# 20x  8# 20X 12# 20x  12# 20x               Ext  12# x 20 12# x 20      8# 20x  8# 20X 12# 20x 12# 20x               IR/ER  IR5#ER3# x 20 IR 5# x 20 and ER 3# x 20     4# 20x  4# 20X  4# 20x  4# 20x

## 2021-02-18 ENCOUNTER — APPOINTMENT (OUTPATIENT)
Dept: PHYSICAL THERAPY | Age: 76
End: 2021-02-18
Payer: MEDICARE

## 2021-02-22 ENCOUNTER — APPOINTMENT (OUTPATIENT)
Dept: PHYSICAL THERAPY | Age: 76
End: 2021-02-22
Payer: MEDICARE

## 2021-02-25 ENCOUNTER — OFFICE VISIT (OUTPATIENT)
Dept: PHYSICAL THERAPY | Age: 76
End: 2021-02-25
Payer: MEDICARE

## 2021-02-25 DIAGNOSIS — Z96.612 AFTERCARE FOLLOWING LEFT SHOULDER JOINT REPLACEMENT SURGERY: ICD-10-CM

## 2021-02-25 DIAGNOSIS — Z47.1 AFTERCARE FOLLOWING LEFT SHOULDER JOINT REPLACEMENT SURGERY: ICD-10-CM

## 2021-02-25 DIAGNOSIS — Z96.612 H/O TOTAL SHOULDER REPLACEMENT, LEFT: Primary | ICD-10-CM

## 2021-02-25 DIAGNOSIS — Z96.612 STATUS POST TOTAL SHOULDER REPLACEMENT, LEFT: ICD-10-CM

## 2021-02-25 PROCEDURE — 97140 MANUAL THERAPY 1/> REGIONS: CPT

## 2021-02-25 PROCEDURE — 97110 THERAPEUTIC EXERCISES: CPT

## 2021-02-25 NOTE — PROGRESS NOTES
Daily Note     Today's date: 2021  Patient name: Roro García  : 1945  MRN: [id number]  Referring provider: Eufemia Juárez MD  Dx:   Encounter Diagnosis     ICD-10-CM    1  H/O total shoulder replacement, left  I52 634    2  Status post total shoulder replacement, left  Z96 612    3  Aftercare following left shoulder joint replacement surgery  Z47 1     Z96 612                   Subjective: Pt reported feeling strong "I will carry on with my exercises at home"       Objective: See treatment diary below      Assessment: Tolerated session well   Discharged       Plan: D/C to HEP     Precautions: s/p LEFT TSA on 2020:   Weeks 8 initiate Strength as per protocol NO MORE THAN 5 LBS on left le!      Patient's PMHx is remarkable for HTN with 40 mg of Lisinopril and taking metformin 500 mg 2 x per day, and restless leg syndrome and is taking Requip 2 x per day and bilateral TKA                Manuals  2/15/21  2/17/21  2/25                       Prom left GH joint with specif restrictions noted above  10 min  10 min 10 MIN           There ex             Pulleys  7 min  7 min  7 MIN           Wall slides flex/scap  20x flexion and scaption with 1 5# 20x flexion and scaption with 1 5# 20X           Standing flex/ scap  20x with 3# 20X 2#  20x 3#           Supine cane flex/ chest press  30x 5# 30X 5#  30x 6#           Supine scap punches  30x 4# 30X 4#  30x 4#           S/L ER    30X 3#  20X 3#   20x 3#           S/L abd   20 x 3# 20X 3# 20x 3#           Prone abd with palm down L  20X 2#  20X 2# 20x 3#           Prone rows 20x 2# 20X 2#  20x 3#           Prone ext  20X 2#  20X 2#  20x 3#           Prone scap  20X 2#  20X 2#  20x 3#            [place] Biceps  11# x 20 13# x 20 15# 20x                      Triceps  13# x 20 13# x 20 15# 20x                      Rows  12# x 20 12# x 20 15# 20x                      Ext  12# x 20 12# x 20 15# 20x                        IR/ER  IR5#ER3# x 20 IR 5# x 20 and ER 3# x 20 ER 5# 20x   IR # 4#

## 2021-03-09 ENCOUNTER — TRANSCRIBE ORDERS (OUTPATIENT)
Dept: PHYSICAL THERAPY | Age: 76
End: 2021-03-09

## 2021-03-09 DIAGNOSIS — Z96.612 S/P SHOULDER REPLACEMENT, LEFT: Primary | ICD-10-CM

## 2021-04-26 ENCOUNTER — TRANSCRIBE ORDERS (OUTPATIENT)
Dept: PHYSICAL THERAPY | Age: 76
End: 2021-04-26

## 2021-04-26 DIAGNOSIS — Z96.612 STATUS POST SHOULDER REPLACEMENT, LEFT: Primary | ICD-10-CM

## 2021-11-23 ENCOUNTER — EVALUATION (OUTPATIENT)
Dept: OCCUPATIONAL THERAPY | Age: 76
End: 2021-11-23
Payer: MEDICARE

## 2021-11-23 DIAGNOSIS — Z98.890 STATUS POST MUSCULOSKELETAL SYSTEM SURGERY: Primary | ICD-10-CM

## 2021-11-23 PROCEDURE — 97165 OT EVAL LOW COMPLEX 30 MIN: CPT

## 2021-11-23 PROCEDURE — 97110 THERAPEUTIC EXERCISES: CPT

## 2021-11-29 ENCOUNTER — OFFICE VISIT (OUTPATIENT)
Dept: OCCUPATIONAL THERAPY | Age: 76
End: 2021-11-29
Payer: MEDICARE

## 2021-11-29 DIAGNOSIS — Z98.890 STATUS POST MUSCULOSKELETAL SYSTEM SURGERY: Primary | ICD-10-CM

## 2021-11-29 PROCEDURE — 97140 MANUAL THERAPY 1/> REGIONS: CPT

## 2021-11-29 PROCEDURE — 97110 THERAPEUTIC EXERCISES: CPT

## 2021-12-02 ENCOUNTER — OFFICE VISIT (OUTPATIENT)
Dept: OCCUPATIONAL THERAPY | Age: 76
End: 2021-12-02
Payer: MEDICARE

## 2021-12-02 DIAGNOSIS — Z98.890 STATUS POST MUSCULOSKELETAL SYSTEM SURGERY: Primary | ICD-10-CM

## 2021-12-02 PROCEDURE — 97110 THERAPEUTIC EXERCISES: CPT

## 2021-12-02 PROCEDURE — 97140 MANUAL THERAPY 1/> REGIONS: CPT

## 2021-12-03 ENCOUNTER — APPOINTMENT (OUTPATIENT)
Dept: OCCUPATIONAL THERAPY | Age: 76
End: 2021-12-03
Payer: MEDICARE

## 2021-12-06 ENCOUNTER — OFFICE VISIT (OUTPATIENT)
Dept: OCCUPATIONAL THERAPY | Age: 76
End: 2021-12-06
Payer: MEDICARE

## 2021-12-06 DIAGNOSIS — Z98.890 STATUS POST MUSCULOSKELETAL SYSTEM SURGERY: Primary | ICD-10-CM

## 2021-12-06 PROCEDURE — 97140 MANUAL THERAPY 1/> REGIONS: CPT

## 2021-12-06 PROCEDURE — 97110 THERAPEUTIC EXERCISES: CPT

## 2021-12-14 ENCOUNTER — OFFICE VISIT (OUTPATIENT)
Dept: OCCUPATIONAL THERAPY | Age: 76
End: 2021-12-14
Payer: MEDICARE

## 2021-12-14 DIAGNOSIS — Z98.890 STATUS POST MUSCULOSKELETAL SYSTEM SURGERY: Primary | ICD-10-CM

## 2021-12-14 PROCEDURE — 97110 THERAPEUTIC EXERCISES: CPT

## 2021-12-14 PROCEDURE — 97140 MANUAL THERAPY 1/> REGIONS: CPT

## 2021-12-20 ENCOUNTER — OFFICE VISIT (OUTPATIENT)
Dept: OCCUPATIONAL THERAPY | Age: 76
End: 2021-12-20
Payer: MEDICARE

## 2021-12-20 DIAGNOSIS — Z98.890 STATUS POST MUSCULOSKELETAL SYSTEM SURGERY: Primary | ICD-10-CM

## 2021-12-20 PROCEDURE — 97140 MANUAL THERAPY 1/> REGIONS: CPT

## 2021-12-20 PROCEDURE — 97110 THERAPEUTIC EXERCISES: CPT

## 2022-01-03 ENCOUNTER — OFFICE VISIT (OUTPATIENT)
Dept: OCCUPATIONAL THERAPY | Age: 77
End: 2022-01-03
Payer: MEDICARE

## 2022-01-03 DIAGNOSIS — Z98.890 STATUS POST MUSCULOSKELETAL SYSTEM SURGERY: Primary | ICD-10-CM

## 2022-01-03 PROCEDURE — 97110 THERAPEUTIC EXERCISES: CPT

## 2022-01-03 PROCEDURE — 97140 MANUAL THERAPY 1/> REGIONS: CPT

## 2022-01-07 ENCOUNTER — APPOINTMENT (OUTPATIENT)
Dept: OCCUPATIONAL THERAPY | Age: 77
End: 2022-01-07
Payer: MEDICARE

## 2022-01-10 ENCOUNTER — OFFICE VISIT (OUTPATIENT)
Dept: OCCUPATIONAL THERAPY | Age: 77
End: 2022-01-10
Payer: MEDICARE

## 2022-01-10 DIAGNOSIS — Z98.890 STATUS POST MUSCULOSKELETAL SYSTEM SURGERY: Primary | ICD-10-CM

## 2022-01-10 PROCEDURE — 97140 MANUAL THERAPY 1/> REGIONS: CPT

## 2022-01-10 PROCEDURE — 97110 THERAPEUTIC EXERCISES: CPT

## 2022-01-10 NOTE — PROGRESS NOTES
Daily Note     Today's date: 1/10/2022  Patient name: Carol Valero  : 1945  MRN: [id number]  Referring provider: Alberto Linder MD  Dx:   Encounter Diagnosis     ICD-10-CM    1  Status post musculoskeletal system surgery  W51 112                   Subjective: It's better, but not perfect      Objective: See treatment diary below      Assessment: Tolerated treatment well  Patient would benefit from continued OT  Pain on radial side of RF  Plan: Continue per plan of care        Precautions: OAA Extensor Tensynovectomy (please see Media Tab for Specifics)      Manuals 11/29 12/2 12/6 12/14 12/20 1/3 1/10      MEM 10' 10' 10'          STM    10' 10' 10' 10'      KT  Fan Cut                        Neuro Re-Ed                                                                                                        Ther Ex             Towel Scrunch             Progressive Grasp             Wrist AAROM 10' 10' 5'          Wrist AROM  Holding sm iso x20 all planes Holding Sm Iso x20 all planes Wrist Maze 10x Wrist maze 10x YFB 2x10 all planes YFB 2x10 all panes      Gross Grasp   RPW 3x10 RPW 3x10 GPW 3x10 GPW 3x10 BPW 3x10      Hook Roll    Pencil x20 Pencil x20, Pencil Push into foam x20 Pencil x20, Pencil Push into foam x20                     HEP: Per orders, finger flex/ext with wrist in neutral, wrist flex/ext with composite fist             Ther Activity             Pinch Pins      R/R 2x R/G 2x                   Gait Training                                       Modalities

## 2022-01-17 ENCOUNTER — APPOINTMENT (OUTPATIENT)
Dept: OCCUPATIONAL THERAPY | Age: 77
End: 2022-01-17
Payer: MEDICARE

## 2022-01-21 ENCOUNTER — OFFICE VISIT (OUTPATIENT)
Dept: OCCUPATIONAL THERAPY | Age: 77
End: 2022-01-21
Payer: MEDICARE

## 2022-01-21 DIAGNOSIS — Z98.890 STATUS POST MUSCULOSKELETAL SYSTEM SURGERY: Primary | ICD-10-CM

## 2022-01-21 PROCEDURE — 97110 THERAPEUTIC EXERCISES: CPT

## 2022-01-21 NOTE — PROGRESS NOTES
Daily Note     Today's date: 2022  Patient name: Nadir Bridges  : 1945  MRN: [id number]  Referring provider: Jacinto Cuevas MD  Dx:   Encounter Diagnosis     ICD-10-CM    1  Status post musculoskeletal system surgery  K28 433                    Subjective:Offers no new complaints      Objective: See treatment diary below  10' late, treatment plan adjusted accordingly      Assessment: Tolerated treatment well  Patient would benefit from continued OT  Plan: Continue per plan of care    RE with DC NV     Precautions: OAA Extensor Tensynovectomy (please see Media Tab for Specifics)      Manuals 11/29 12/2 12/6 12/14 12/20 1/3 1/10 1/21     MEM 10' 10' 10'          STM    10' 10' 10' 10' 5'     KT  Fan Cut                        Neuro Re-Ed                                                                                                        Ther Ex             Towel Scrunch             Progressive Grasp             Wrist AAROM 10' 10' 5'          Wrist AROM  Holding sm iso x20 all planes Holding Sm Iso x20 all planes Wrist Maze 10x Wrist maze 10x YFB 2x10 all planes YFB 2x10 all panes RFB 2x10 all planes     Gross Grasp   RPW 3x10 RPW 3x10 GPW 3x10 GPW 3x10 BPW 3x10 BPW 3x10     Hook Roll    Pencil x20 Pencil x20, Pencil Push into foam x20 Pencil x20, Pencil Push into foam x20                     HEP: Per orders, finger flex/ext with wrist in neutral, wrist flex/ext with composite fist             Ther Activity             Pinch Pins      R/R 2x R/G 2x R/G 2x                  Gait Training                                       Modalities

## 2022-01-24 ENCOUNTER — EVALUATION (OUTPATIENT)
Dept: OCCUPATIONAL THERAPY | Age: 77
End: 2022-01-24
Payer: MEDICARE

## 2022-01-24 DIAGNOSIS — Z98.890 STATUS POST MUSCULOSKELETAL SYSTEM SURGERY: Primary | ICD-10-CM

## 2022-01-24 PROCEDURE — 97140 MANUAL THERAPY 1/> REGIONS: CPT | Performed by: OCCUPATIONAL THERAPIST

## 2022-01-24 NOTE — PROGRESS NOTES
OT Re-Evaluation     Today's date: 2022  Patient name: Duyen Lehman  : 1945  MRN: [id number]  Referring provider: Christen Benavidez MD  Dx:   Encounter Diagnosis     ICD-10-CM    1  Status post musculoskeletal system surgery  Z98 890                   Assessment  Assessment details: Brigitte Turner shows good improvement to her pain and AROM, as well as good functional strength  DC to HEP  Impairments: abnormal or restricted ROM, activity intolerance, impaired physical strength, lacks appropriate home exercise program and pain with function    Goals  STG 1: Increase wrist AROM 25% in 4-6 weeks  MET  STG 2: Decrease overall pain to 3/10 in 4-6 weeks  MET  STG 3: Increase overall strength by 25% in 4-6 weeks  MET  STG 4: Compliant with HEP in 2 weeks  MET    LTG 1: Complete all ADL/IADLs improved to prior level of function within 6-8 weeks  LTG 2: Leisure/social skills improved within 6-8 weeks  LTG 3: Work skills improved to prior level of function within 6-8 weeks     Patient Goal: to just get better    Goals, plan of care and treatment condition discussed with patient  Patient expresses their understanding and questions regarding these isues were addressed  Plan  Patient would benefit from: custom splinting, skilled occupational therapy and OT eval  Planned modality interventions: thermotherapy: hydrocollator packs, thermotherapy: paraffin bath and fluidotherapy  Planned therapy interventions: joint mobilization, manual therapy, [place] taping, home exercise program, graded exercise, graded activity, functional ROM exercises, therapeutic activities, therapeutic exercise and fine motor coordination training  Frequency: 2x week        Subjective Evaluation    History of Present Illness  Onset date: 3 months  Date of surgery: 2021  Mechanism of injury: Noted a ganglion cyst 30 year ago, fluid building up around it about 3 months ago   Elected for a tenosynovectomy   Pain  Current pain ratin  At best pain ratin  At worst pain rating: 3          Objective     Active Range of Motion     Left Wrist   Wrist flexion: 4 degrees   Wrist extension: 45 degrees   Radial deviation: 15 degrees   Ulnar deviation: 30 degrees     Left Thumb   Opposition: Full opposition    Additional Active Range of Motion Details  Full composite flexion    Strength/Myotome Testing     Left Wrist/Hand      (2nd hand position)     Trial 1: 35 1    Right Wrist/Hand      (2nd hand position)     Trial 1: 43 9    Swelling     Left Wrist/Hand   Circumference wrist: 16 3 cm    Right Wrist/Hand   Circumference wrist: 16 cm             Precautions: OAA Extensor Tensynovectomy (please see Media Tab for Specifics)      Manuals             MEM             STM                                       Neuro Re-Ed                                                                                                        Ther Ex             Towel Scrunch             Progressive Grasp             Wrist AROM                                                                 HEP: Per orders, finger flex/ext with wrist in neutral, wrist flex/ext with composite fist             Ther Activity                                       Gait Training                                       Modalities

## 2023-12-21 NOTE — PROGRESS NOTES
Daily Note     Today's date: 2020  Patient name: Alvina Chamorro  : 1945  MRN: [id number]  Referring provider: Jose Ndiaye MD  Dx:   Encounter Diagnosis     ICD-10-CM    1  Aftercare following left shoulder joint replacement surgery  Z47 1     Z96 612    2  Status post total shoulder replacement, left  Z96 612                   Subjective: Patient noted left shoulder pain is decreased since betting her left ue sling to fit better   Patient noted left shoulder pain is at 1 of 10  Objective: See treatment diary below  Assessment:  Patient presents with all left [address] joint prom at surgeon specific levels at this point in the surgeon specific protocol  Patient to d/c sling on 2020 with pt understanding this  Patient continues to exhibit sleep deficits due to use of left ue sling and prom only  Plan: Cont with plan of care      Precautions: s/p Right TSA on 2020: max ER at 30 degrees, NO active IR to protect the subscapularis, wear sling for 4 weeks ( until 2020) aarom flexion at 110 and active elbow wrist and hand movements to pt tolerance  Weeks 1-2 (2020 - 2020 ) : FF at 90; abd at 75, ER at 45 abd at 30 and IR at 45 abd at 30  Weeks 3-4 (09/10/2020 - 2020 ) : FF at 120; abd at 90, ER at 75 abd at 50 and IR at 75 abd at 45      Patient's PMHx is remarkable for HTN with 40 mg of Lisinopril and taking metformin 500 mg 2 x per day, and restless leg syndrome and is taking Requip 2 x per day and bilateral TKA        Manuals       Prom left GH joint with specific restrictions noted above  10 MIN  10 min 10 min 10 min                   Ther Ex            pulleys  5 min  6 min 6 min 6 min 6 min      Scapular squeezes  20x 2sec  3 sec x 20 3 sec x 20 3 sec x 20 3 sec x 20      Shoulder shrugs  NT NT NT NT       Pendulums:L forward / back & side to side 5 x 20x  2 x 10 2 x 10 2 x 10 2 x 10      Elbow flexion:L 3 x  20x  2 x 10 2 x 10 2 x 10 2 x 10      Forearm supination:L 3 x   2 x 10 2 x 10 2 x 10 2 x 10      Wrist flexion and extension:L 3 x  20x  2 x 10 2 x 10   2 x 10 2 x 10      Hand gripping:L 3 x  Yellow  10x3 Green x 20 Green x 20 NT NT      Left UT stretch 2 x  20sec 5x  20 sec x 5  20 sec x 5 20 sec x 5      Left LS stretch  NT  20 sec x 5  20 sec x 5 20 sec x 5      Postural correction while seated  20X  3 sec x 20  3 sec x 20 20 sec x 5      Prom left shoulder flexion table slides  NT 2 x 10 2 x 10 2 x 10 flex and scap 2 x 10 of each      Shoulder isometrics flex, abd and ER:L no IR until 6th week      5 sec x 20                  Left shoulder prom ER and abd with spc with motion restrictions listed above  NT NT 2 x 10 2 x 10 2 x 10                                          Modalities            CP to left GH joint with pt seated 10 min 10 MIN  10 min  10 min 10 min (2) well flexed
